# Patient Record
Sex: MALE | Race: WHITE | NOT HISPANIC OR LATINO | Employment: OTHER | URBAN - METROPOLITAN AREA
[De-identification: names, ages, dates, MRNs, and addresses within clinical notes are randomized per-mention and may not be internally consistent; named-entity substitution may affect disease eponyms.]

---

## 2023-10-02 PROBLEM — G45.4 TRANSIENT GLOBAL AMNESIA: Status: ACTIVE | Noted: 2023-10-02

## 2023-10-02 PROBLEM — R73.9 HYPERGLYCEMIA: Status: ACTIVE | Noted: 2023-10-02

## 2023-10-02 PROBLEM — R41.82 ALTERED MENTAL STATUS: Status: ACTIVE | Noted: 2023-10-02

## 2023-10-02 PROBLEM — M75.112 NONTRAUMATIC INCOMPLETE TEAR OF LEFT ROTATOR CUFF: Status: ACTIVE | Noted: 2023-10-02

## 2023-10-02 PROBLEM — R41.89 IMPAIRED COGNITION: Status: ACTIVE | Noted: 2023-10-02

## 2023-10-02 PROBLEM — R41.3 AMNESIA: Status: ACTIVE | Noted: 2023-10-02

## 2023-10-02 PROBLEM — N40.0 ENLARGED PROSTATE: Status: ACTIVE | Noted: 2023-10-02

## 2023-10-02 PROBLEM — H91.90 ACQUIRED HEARING LOSS: Status: ACTIVE | Noted: 2023-10-02

## 2023-10-02 PROBLEM — I10 HYPERTENSION: Status: ACTIVE | Noted: 2023-10-02

## 2023-10-02 PROBLEM — I10 BENIGN ESSENTIAL HYPERTENSION: Status: ACTIVE | Noted: 2023-10-02

## 2023-10-02 PROBLEM — M19.011 PRIMARY OSTEOARTHRITIS, RIGHT SHOULDER: Status: ACTIVE | Noted: 2023-10-02

## 2023-10-02 PROBLEM — G45.9 TRANSIENT ISCHEMIC ATTACK: Status: ACTIVE | Noted: 2023-10-02

## 2023-10-02 PROBLEM — M10.9 GOUT: Status: ACTIVE | Noted: 2023-10-02

## 2023-10-02 RX ORDER — ALLOPURINOL 100 MG/1
100 TABLET ORAL DAILY
COMMUNITY
Start: 2022-05-21 | End: 2024-05-14 | Stop reason: SDUPTHER

## 2023-10-02 RX ORDER — POTASSIUM CHLORIDE 750 MG/1
10 TABLET, FILM COATED, EXTENDED RELEASE ORAL EVERY MORNING
COMMUNITY
End: 2024-05-14 | Stop reason: ALTCHOICE

## 2023-10-02 RX ORDER — ATORVASTATIN CALCIUM 20 MG/1
20 TABLET, FILM COATED ORAL NIGHTLY
COMMUNITY
Start: 2022-03-29 | End: 2024-05-14 | Stop reason: SDUPTHER

## 2023-10-02 RX ORDER — ACETAMINOPHEN 325 MG/1
650 TABLET ORAL EVERY 4 HOURS PRN
COMMUNITY

## 2023-10-02 RX ORDER — ASPIRIN 81 MG/1
TABLET ORAL
COMMUNITY

## 2023-10-02 RX ORDER — LISINOPRIL 10 MG/1
10 TABLET ORAL DAILY
COMMUNITY
End: 2024-05-14 | Stop reason: ALTCHOICE

## 2023-10-02 RX ORDER — AMLODIPINE BESYLATE 5 MG/1
5 TABLET ORAL DAILY
COMMUNITY
Start: 2022-04-12 | End: 2024-05-14 | Stop reason: ALTCHOICE

## 2023-10-02 RX ORDER — BENZONATATE 100 MG/1
CAPSULE ORAL
COMMUNITY
Start: 2020-08-11 | End: 2024-05-14 | Stop reason: ALTCHOICE

## 2023-10-02 RX ORDER — LISINOPRIL 20 MG/1
20 TABLET ORAL DAILY
COMMUNITY
Start: 2021-12-31 | End: 2024-05-14 | Stop reason: SDUPTHER

## 2023-10-02 RX ORDER — AMLODIPINE AND BENAZEPRIL HYDROCHLORIDE 5; 10 MG/1; MG/1
1 CAPSULE ORAL DAILY
COMMUNITY
End: 2024-05-14 | Stop reason: SDUPTHER

## 2023-10-02 RX ORDER — TAMSULOSIN HYDROCHLORIDE 0.4 MG/1
0.4 CAPSULE ORAL DAILY
COMMUNITY
Start: 2022-03-29 | End: 2024-05-14 | Stop reason: SDUPTHER

## 2023-10-02 RX ORDER — HYDROCHLOROTHIAZIDE 25 MG/1
25 TABLET ORAL DAILY
COMMUNITY
End: 2024-05-14 | Stop reason: ALTCHOICE

## 2023-10-04 ENCOUNTER — OFFICE VISIT (OUTPATIENT)
Dept: OTOLARYNGOLOGY | Facility: CLINIC | Age: 73
End: 2023-10-04
Payer: MEDICARE

## 2023-10-04 DIAGNOSIS — H61.23 BILATERAL IMPACTED CERUMEN: Primary | ICD-10-CM

## 2023-10-04 PROCEDURE — 99213 OFFICE O/P EST LOW 20 MIN: CPT | Performed by: OTOLARYNGOLOGY

## 2023-10-04 ASSESSMENT — ENCOUNTER SYMPTOMS: OCCASIONAL FEELINGS OF UNSTEADINESS: 0

## 2023-10-04 NOTE — PROGRESS NOTES
Subjective   Patient ID: Anthony Cleaning is a 72 y.o. male who presents for ear wax.    HPI  Patient has hearing aids, use shearing aids.  On examination there was some wax bilaterally.  Cleaning was done. TMs intact.    Recommendation:  Follow-up once a year    Review of Systems  Difficulty with hearing  Discharge from ears  Sinus pressure  Snoring  Cardiac murmur  Musculoskeletal joint pain  Hypertension  Arthritis    Objective   Physical Exam  General appearance: Healthy-appearing, well-nourished, well groomed, in no acute distress.     Head and Face: Atraumatic with no masses, lesions, or scarring.      Salivary glands: No tenderness of the parotid glands or parotid masses.     No tenderness of the submandibular glands or submandibular masses.      Facial strength: Normal strength and symmetry, no synkinesis or facial tic.     Eyes: Conjunctivas look non-hyperemic bilaterally    Ears: Bilaterally ear wax was cleaned. Ear canals look normal. Tympanic membranes look intact, no hyperemia, fluid or retraction. Hearing grossly normal.      Nose: Mucosa looks normal. No purulent discharge.   Oral Cavity/Mouth: Lips and tongue look normal.     Throat: No postnasal discharge. No tonsil hypertrophy. No hyperemia.    Neck: Symmetrical, trachea midline.     Pulmonary: Normal respiratory effort.     Lymphatic: No palpable pathologic lymph nodes at neck.     Neurological/Psychiatric Orientation to person, place, and time: Normal.     Mood and affect: Normal.      Extremities: No clubbing.     Skin: No significant skin lesions were noted at face or neck    Assessment/Plan   Ear wax was cleaned.    Recommendation:  Follow-up once a year

## 2023-11-03 DIAGNOSIS — U07.1 COVID: Primary | ICD-10-CM

## 2023-11-03 RX ORDER — NIRMATRELVIR AND RITONAVIR 300-100 MG
3 KIT ORAL 2 TIMES DAILY
Qty: 30 TABLET | Refills: 0 | Status: SHIPPED | OUTPATIENT
Start: 2023-11-03 | End: 2023-11-08

## 2023-12-13 ENCOUNTER — OFFICE VISIT (OUTPATIENT)
Dept: ORTHOPEDIC SURGERY | Facility: CLINIC | Age: 73
End: 2023-12-13
Payer: MEDICARE

## 2023-12-13 DIAGNOSIS — M19.011 PRIMARY OSTEOARTHRITIS, RIGHT SHOULDER: Primary | ICD-10-CM

## 2023-12-13 DIAGNOSIS — M25.511 RIGHT SHOULDER PAIN, UNSPECIFIED CHRONICITY: ICD-10-CM

## 2023-12-13 PROCEDURE — 1036F TOBACCO NON-USER: CPT | Performed by: ORTHOPAEDIC SURGERY

## 2023-12-13 PROCEDURE — 1125F AMNT PAIN NOTED PAIN PRSNT: CPT | Performed by: ORTHOPAEDIC SURGERY

## 2023-12-13 PROCEDURE — 1160F RVW MEDS BY RX/DR IN RCRD: CPT | Performed by: ORTHOPAEDIC SURGERY

## 2023-12-13 PROCEDURE — 99213 OFFICE O/P EST LOW 20 MIN: CPT | Performed by: ORTHOPAEDIC SURGERY

## 2023-12-13 PROCEDURE — 20610 DRAIN/INJ JOINT/BURSA W/O US: CPT | Performed by: ORTHOPAEDIC SURGERY

## 2023-12-13 PROCEDURE — 1159F MED LIST DOCD IN RCRD: CPT | Performed by: ORTHOPAEDIC SURGERY

## 2023-12-13 RX ORDER — TRIAMCINOLONE ACETONIDE 40 MG/ML
40 INJECTION, SUSPENSION INTRA-ARTICULAR; INTRAMUSCULAR
Status: COMPLETED | OUTPATIENT
Start: 2023-12-13 | End: 2023-12-13

## 2023-12-13 RX ORDER — BUPIVACAINE HYDROCHLORIDE 2.5 MG/ML
1 INJECTION, SOLUTION INFILTRATION; PERINEURAL
Status: COMPLETED | OUTPATIENT
Start: 2023-12-13 | End: 2023-12-13

## 2023-12-13 RX ADMIN — TRIAMCINOLONE ACETONIDE 40 MG: 40 INJECTION, SUSPENSION INTRA-ARTICULAR; INTRAMUSCULAR at 10:16

## 2023-12-13 RX ADMIN — BUPIVACAINE HYDROCHLORIDE 1 ML: 2.5 INJECTION, SOLUTION INFILTRATION; PERINEURAL at 10:16

## 2023-12-13 ASSESSMENT — PAIN SCALES - GENERAL: PAINLEVEL_OUTOF10: 5 - MODERATE PAIN

## 2023-12-13 ASSESSMENT — PATIENT HEALTH QUESTIONNAIRE - PHQ9
1. LITTLE INTEREST OR PLEASURE IN DOING THINGS: NOT AT ALL
2. FEELING DOWN, DEPRESSED OR HOPELESS: NOT AT ALL
SUM OF ALL RESPONSES TO PHQ9 QUESTIONS 1 & 2: 0

## 2023-12-13 ASSESSMENT — PAIN - FUNCTIONAL ASSESSMENT: PAIN_FUNCTIONAL_ASSESSMENT: 0-10

## 2023-12-13 ASSESSMENT — PAIN DESCRIPTION - DESCRIPTORS: DESCRIPTORS: THROBBING

## 2023-12-13 NOTE — PROGRESS NOTES
Subjective    Patient ID: Chetna Beltre is a 73 y.o. male.    Chief Complaint: Pain of the Right Shoulder         Right Shoulder       This 73-year-old male is here once again complaining of right shoulder pain.  He has a history of degenerative arthritis of the shoulder and has had previous injections with good results.  He states that he was doing quite a bit of work with his arms about a week ago and that he has had increased pain in his shoulder since that time.  He does take occasional anti-inflammatories but limits them due to his other medical issues.  He is here requesting a repeat injection.  Objective   Ortho Exam  On physical examination he is noted to be a somewhat overweight male.  Examination of his right shoulder reveals there he does have stiffness with full elevation as well as the extremes of internal and external rotation.  There is no motor or sensory deficit noted to be a good range of motion.  To the right upper extremity.  Image Results:  MR shoulder right wo IV contrast  PROCEDURE:         SHOULDER RT WO - TMR  2168  REASON FOR EXAM: ACUTE PAIN OF RIGHT SHOULDER    RESULT: MRN: [id number]  Patient Name: CHETNA BELTRE    STUDY:  SHOULDER RT WO 2/3/2023 9:34 am    INDICATION:  ACUTE PAIN OF RIGHT SHOULDER fall pain motion    COMPARISON:  12/20/2022    ACCESSION NUMBER(S):  [id number]    ORDERING CLINICIAN:  ANAIS LUONG    TECHNIQUE:  Multiplanar multisequence MR images were obtained of the right shoulder.    FINDINGS:  Supraspinatus tendon demonstrates mild tendinosis. There is low-grade  partial articular sided tear involving portions of the mid to posterior  supraspinatus tendon at the footprint. Mild subcortical cystic change  demonstrated in the superior facet of the greater tuberosity measuring 3 mm.  No focal full-thickness perforation demonstrated. Supraspinatus muscle is  unremarkable.    Infraspinatus tendon is unremarkable. Infraspinatus muscle is unremarkable.  There is diffuse chronic  atrophy of the teres minor muscle nonspecific. No  focal abnormality in the quadrilateral space.    Subscapularis tendon demonstrates moderate tendinosis. No focal tendon tear  demonstrated. There is mild subcortical cystic change within the lesser  tuberosity. No reactive marrow edema demonstrated. Subscapularis muscle is  unremarkable.    Long head of the biceps tendon demonstrates mild tendinosis in the bicipital  groove with mild asymmetric fluid within the biceps tendon sheath with mild  surrounding edema about the transverse humeral ligament with component of  bicipital tenosynovitis suggested. Intra-articular portion the tendon  proximally demonstrates mild tendinosis. No significant edema within the  rotator interval.    Glenohumeral articulation demonstrates mild osteoarthritic degenerative  change. Glenoid labrum demonstrates no detachment. Axillary pouch is  unremarkable pre quadrilateral space is unremarkable.    Subacromial subdeltoid bursitis. Acromioclavicular joint demonstrates mild  osteoarthritic degenerative change. A type 1 acromion is seen.    IMPRESSION:  1. Bicipital tenosynovitis is demonstrated with mild reactive edema about  the tendon along the anterior margin of the humerus superficial to the  transverse humeral ligament.    2. Mild supraspinatus tendinosis with low-grade partial articular sided tear  of the mid to posterior tendon at the footprint. No focal full-thickness  perforation demonstrated.    3. Moderate subscapularis tendinosis.  Dictation workstation:   [id number]    Original Interpreting Physician:   NII HOROWITZ MD  Original Transcribed by/Date: MMODAL Feb  3 2023  8:50A  Original Electronically Signed by/Date: NII HOROWITZ MD Feb  3 2023 10:32A    Addendum Interpreting Physician:  Addendum Transcribed by/Date: NO ADDENDUM  Addendum Electronically Signed by/Date:      Assessment/Plan   Encounter Diagnoses:  Primary osteoarthritis, right shoulder    Right shoulder  pain, unspecified chronicity  I discussed his clinical findings as well as previous MRI findings with him.  He is here requesting a repeat injection and I agreed.  Patient ID: Anthony Cleaning is a 73 y.o. male.    L Inj/Asp: R glenohumeral on 12/13/2023 10:16 AM  Indications: pain  Details: 25 G needle, lateral approach  Medications: 40 mg triamcinolone acetonide 40 mg/mL; 1 mL BUPivacaine HCl 0.25 % (2.5 mg/mL)  Outcome: tolerated well, no immediate complications  Procedure, treatment alternatives, risks and benefits explained, specific risks discussed. Consent was given by the patient. Immediately prior to procedure a time out was called to verify the correct patient, procedure, equipment, support staff and site/side marked as required. Patient was prepped and draped in the usual sterile fashion.       He is to return on an as-needed basis.

## 2024-05-14 ENCOUNTER — OFFICE VISIT (OUTPATIENT)
Dept: PRIMARY CARE | Facility: CLINIC | Age: 74
End: 2024-05-14
Payer: MEDICARE

## 2024-05-14 VITALS
WEIGHT: 250 LBS | HEART RATE: 73 BPM | SYSTOLIC BLOOD PRESSURE: 122 MMHG | TEMPERATURE: 97.9 F | OXYGEN SATURATION: 96 % | BODY MASS INDEX: 41.65 KG/M2 | HEIGHT: 65 IN | DIASTOLIC BLOOD PRESSURE: 60 MMHG

## 2024-05-14 DIAGNOSIS — R35.0 FREQUENT URINATION: ICD-10-CM

## 2024-05-14 DIAGNOSIS — R53.83 TIRED: ICD-10-CM

## 2024-05-14 DIAGNOSIS — E79.0 HYPERURICEMIA: ICD-10-CM

## 2024-05-14 DIAGNOSIS — M19.90 ARTHRITIS: ICD-10-CM

## 2024-05-14 DIAGNOSIS — Z12.5 SCREENING FOR PROSTATE CANCER: ICD-10-CM

## 2024-05-14 DIAGNOSIS — I10 PRIMARY HYPERTENSION: Primary | ICD-10-CM

## 2024-05-14 PROCEDURE — 3074F SYST BP LT 130 MM HG: CPT | Performed by: FAMILY MEDICINE

## 2024-05-14 PROCEDURE — 1159F MED LIST DOCD IN RCRD: CPT | Performed by: FAMILY MEDICINE

## 2024-05-14 PROCEDURE — 1125F AMNT PAIN NOTED PAIN PRSNT: CPT | Performed by: FAMILY MEDICINE

## 2024-05-14 PROCEDURE — 99214 OFFICE O/P EST MOD 30 MIN: CPT | Performed by: FAMILY MEDICINE

## 2024-05-14 PROCEDURE — 3078F DIAST BP <80 MM HG: CPT | Performed by: FAMILY MEDICINE

## 2024-05-14 RX ORDER — TAMSULOSIN HYDROCHLORIDE 0.4 MG/1
0.4 CAPSULE ORAL DAILY
Qty: 90 CAPSULE | Refills: 3 | Status: SHIPPED | OUTPATIENT
Start: 2024-05-14

## 2024-05-14 RX ORDER — ATORVASTATIN CALCIUM 20 MG/1
20 TABLET, FILM COATED ORAL NIGHTLY
Qty: 90 TABLET | Refills: 3 | Status: SHIPPED | OUTPATIENT
Start: 2024-05-14

## 2024-05-14 RX ORDER — ALLOPURINOL 100 MG/1
100 TABLET ORAL DAILY
Qty: 90 TABLET | Refills: 3 | Status: SHIPPED | OUTPATIENT
Start: 2024-05-14

## 2024-05-14 RX ORDER — AMLODIPINE AND BENAZEPRIL HYDROCHLORIDE 5; 10 MG/1; MG/1
1 CAPSULE ORAL DAILY
Qty: 90 CAPSULE | Refills: 3 | Status: SHIPPED | OUTPATIENT
Start: 2024-05-14

## 2024-05-14 RX ORDER — LISINOPRIL 20 MG/1
20 TABLET ORAL DAILY
Qty: 90 TABLET | Refills: 3 | Status: SHIPPED | OUTPATIENT
Start: 2024-05-14

## 2024-05-14 ASSESSMENT — PATIENT HEALTH QUESTIONNAIRE - PHQ9
2. FEELING DOWN, DEPRESSED OR HOPELESS: NOT AT ALL
SUM OF ALL RESPONSES TO PHQ9 QUESTIONS 1 AND 2: 0
1. LITTLE INTEREST OR PLEASURE IN DOING THINGS: NOT AT ALL

## 2024-05-14 ASSESSMENT — ENCOUNTER SYMPTOMS
DEPRESSION: 0
OCCASIONAL FEELINGS OF UNSTEADINESS: 0
LOSS OF SENSATION IN FEET: 0

## 2024-05-14 ASSESSMENT — PAIN SCALES - GENERAL: PAINLEVEL: 5

## 2024-05-14 NOTE — PROGRESS NOTES
"Subjective   Patient ID: Anthony Cleaning is a 73 y.o. male who presents for Joint Pain.    HPI     Review of Systems    Objective   /60   Pulse 73   Temp 36.6 °C (97.9 °F)   Ht 1.651 m (5' 5\")   Wt 113 kg (250 lb)   SpO2 96%   BMI 41.60 kg/m²     Physical Exam    Assessment/Plan   {Assess/PlanSmartLinks:87926}       "

## 2024-05-17 ENCOUNTER — LAB (OUTPATIENT)
Dept: LAB | Facility: LAB | Age: 74
End: 2024-05-17
Payer: MEDICARE

## 2024-05-17 DIAGNOSIS — E79.0 HYPERURICEMIA: ICD-10-CM

## 2024-05-17 DIAGNOSIS — R53.83 TIRED: ICD-10-CM

## 2024-05-17 DIAGNOSIS — Z12.5 SCREENING FOR PROSTATE CANCER: ICD-10-CM

## 2024-05-17 DIAGNOSIS — M19.90 ARTHRITIS: ICD-10-CM

## 2024-05-17 DIAGNOSIS — I10 PRIMARY HYPERTENSION: ICD-10-CM

## 2024-05-17 LAB
ALBUMIN SERPL BCP-MCNC: 4.7 G/DL (ref 3.4–5)
ALP SERPL-CCNC: 48 U/L (ref 33–136)
ALT SERPL W P-5'-P-CCNC: 39 U/L (ref 10–52)
ANION GAP SERPL CALC-SCNC: 11 MMOL/L (ref 10–20)
AST SERPL W P-5'-P-CCNC: 33 U/L (ref 9–39)
BASOPHILS # BLD AUTO: 0.06 X10*3/UL (ref 0–0.1)
BASOPHILS NFR BLD AUTO: 0.4 %
BILIRUB SERPL-MCNC: 0.5 MG/DL (ref 0–1.2)
BUN SERPL-MCNC: 17 MG/DL (ref 6–23)
CALCIUM SERPL-MCNC: 9.8 MG/DL (ref 8.6–10.3)
CHLORIDE SERPL-SCNC: 102 MMOL/L (ref 98–107)
CHOLEST SERPL-MCNC: 109 MG/DL (ref 0–199)
CHOLESTEROL/HDL RATIO: 3.3
CO2 SERPL-SCNC: 28 MMOL/L (ref 21–32)
CREAT SERPL-MCNC: 0.99 MG/DL (ref 0.5–1.3)
CREAT UR-MCNC: 102 MG/DL (ref 20–370)
EGFRCR SERPLBLD CKD-EPI 2021: 80 ML/MIN/1.73M*2
EOSINOPHIL # BLD AUTO: 0.31 X10*3/UL (ref 0–0.4)
EOSINOPHIL NFR BLD AUTO: 1.8 %
ERYTHROCYTE [DISTWIDTH] IN BLOOD BY AUTOMATED COUNT: 13.6 % (ref 11.5–14.5)
GLUCOSE SERPL-MCNC: 194 MG/DL (ref 74–99)
HCT VFR BLD AUTO: 47.1 % (ref 41–52)
HDLC SERPL-MCNC: 32.8 MG/DL
HGB BLD-MCNC: 15.5 G/DL (ref 13.5–17.5)
IMM GRANULOCYTES # BLD AUTO: 0.05 X10*3/UL (ref 0–0.5)
IMM GRANULOCYTES NFR BLD AUTO: 0.3 % (ref 0–0.9)
LDLC SERPL CALC-MCNC: 51 MG/DL
LYMPHOCYTES # BLD AUTO: 9.09 X10*3/UL (ref 0.8–3)
LYMPHOCYTES NFR BLD AUTO: 54.1 %
MCH RBC QN AUTO: 32 PG (ref 26–34)
MCHC RBC AUTO-ENTMCNC: 32.9 G/DL (ref 32–36)
MCV RBC AUTO: 97 FL (ref 80–100)
MICROALBUMIN UR-MCNC: 30.3 MG/L
MICROALBUMIN/CREAT UR: 29.7 UG/MG CREAT
MONOCYTES # BLD AUTO: 1 X10*3/UL (ref 0.05–0.8)
MONOCYTES NFR BLD AUTO: 6 %
NEUTROPHILS # BLD AUTO: 6.28 X10*3/UL (ref 1.6–5.5)
NEUTROPHILS NFR BLD AUTO: 37.4 %
NON HDL CHOLESTEROL: 76 MG/DL (ref 0–149)
NRBC BLD-RTO: 0 /100 WBCS (ref 0–0)
PLATELET # BLD AUTO: 300 X10*3/UL (ref 150–450)
POTASSIUM SERPL-SCNC: 4.3 MMOL/L (ref 3.5–5.3)
PROT SERPL-MCNC: 7.2 G/DL (ref 6.4–8.2)
PSA SERPL-MCNC: 5.58 NG/ML
RBC # BLD AUTO: 4.84 X10*6/UL (ref 4.5–5.9)
SODIUM SERPL-SCNC: 137 MMOL/L (ref 136–145)
TRIGL SERPL-MCNC: 124 MG/DL (ref 0–149)
TSH SERPL-ACNC: 1.77 MIU/L (ref 0.44–3.98)
URATE SERPL-MCNC: 5.7 MG/DL (ref 4–7.5)
VIT B12 SERPL-MCNC: 502 PG/ML (ref 211–911)
VLDL: 25 MG/DL (ref 0–40)
WBC # BLD AUTO: 16.8 X10*3/UL (ref 4.4–11.3)

## 2024-05-17 PROCEDURE — 82570 ASSAY OF URINE CREATININE: CPT

## 2024-05-17 PROCEDURE — 36415 COLL VENOUS BLD VENIPUNCTURE: CPT

## 2024-05-17 PROCEDURE — G0103 PSA SCREENING: HCPCS

## 2024-05-17 PROCEDURE — 82607 VITAMIN B-12: CPT

## 2024-05-17 PROCEDURE — 82043 UR ALBUMIN QUANTITATIVE: CPT

## 2024-06-17 DIAGNOSIS — D72.829 LEUKOCYTOSIS, UNSPECIFIED TYPE: ICD-10-CM

## 2024-06-17 DIAGNOSIS — R73.9 HYPERGLYCEMIA: Primary | ICD-10-CM

## 2024-06-17 DIAGNOSIS — R97.20 ELEVATED PSA: ICD-10-CM

## 2024-06-18 ENCOUNTER — HOSPITAL ENCOUNTER (OUTPATIENT)
Dept: RADIOLOGY | Facility: HOSPITAL | Age: 74
Discharge: HOME | End: 2024-06-18
Payer: MEDICARE

## 2024-06-18 ENCOUNTER — OFFICE VISIT (OUTPATIENT)
Dept: ORTHOPEDIC SURGERY | Facility: CLINIC | Age: 74
End: 2024-06-18
Payer: MEDICARE

## 2024-06-18 DIAGNOSIS — M25.511 RIGHT SHOULDER PAIN, UNSPECIFIED CHRONICITY: ICD-10-CM

## 2024-06-18 DIAGNOSIS — M19.011 OSTEOARTHRITIS OF RIGHT SHOULDER, UNSPECIFIED OSTEOARTHRITIS TYPE: Primary | ICD-10-CM

## 2024-06-18 PROCEDURE — 73030 X-RAY EXAM OF SHOULDER: CPT | Mod: RIGHT SIDE | Performed by: RADIOLOGY

## 2024-06-18 PROCEDURE — 20611 DRAIN/INJ JOINT/BURSA W/US: CPT | Performed by: ORTHOPAEDIC SURGERY

## 2024-06-18 PROCEDURE — 2500000005 HC RX 250 GENERAL PHARMACY W/O HCPCS: Performed by: ORTHOPAEDIC SURGERY

## 2024-06-18 PROCEDURE — 2500000004 HC RX 250 GENERAL PHARMACY W/ HCPCS (ALT 636 FOR OP/ED): Performed by: ORTHOPAEDIC SURGERY

## 2024-06-18 PROCEDURE — 1125F AMNT PAIN NOTED PAIN PRSNT: CPT | Performed by: ORTHOPAEDIC SURGERY

## 2024-06-18 PROCEDURE — 1036F TOBACCO NON-USER: CPT | Performed by: ORTHOPAEDIC SURGERY

## 2024-06-18 PROCEDURE — 99213 OFFICE O/P EST LOW 20 MIN: CPT | Performed by: ORTHOPAEDIC SURGERY

## 2024-06-18 PROCEDURE — 99213 OFFICE O/P EST LOW 20 MIN: CPT | Mod: 25 | Performed by: ORTHOPAEDIC SURGERY

## 2024-06-18 PROCEDURE — 1159F MED LIST DOCD IN RCRD: CPT | Performed by: ORTHOPAEDIC SURGERY

## 2024-06-18 PROCEDURE — 1160F RVW MEDS BY RX/DR IN RCRD: CPT | Performed by: ORTHOPAEDIC SURGERY

## 2024-06-18 PROCEDURE — 73030 X-RAY EXAM OF SHOULDER: CPT | Mod: RT

## 2024-06-18 ASSESSMENT — PAIN SCALES - GENERAL: PAINLEVEL_OUTOF10: 7

## 2024-06-18 ASSESSMENT — PAIN - FUNCTIONAL ASSESSMENT: PAIN_FUNCTIONAL_ASSESSMENT: 0-10

## 2024-06-19 RX ORDER — LIDOCAINE HYDROCHLORIDE 10 MG/ML
3 INJECTION INFILTRATION; PERINEURAL
Status: COMPLETED | OUTPATIENT
Start: 2024-06-18 | End: 2024-06-18

## 2024-06-19 ASSESSMENT — ENCOUNTER SYMPTOMS
JOINT SWELLING: 0
TROUBLE SWALLOWING: 0
WHEEZING: 0
FATIGUE: 0
FEVER: 0
SHORTNESS OF BREATH: 0
ARTHRALGIAS: 1
CHILLS: 0
COLOR CHANGE: 0
SINUS PRESSURE: 0

## 2024-06-19 NOTE — PROGRESS NOTES
Reason for Appointment  Chief Complaint   Patient presents with    Right Shoulder - Pain     History of Present Illness  New patient is a 73 y.o. male here today for evaluation of his right shoulder.  He has had increased pain for the last few years. Dr. Rodriguez has injected the shoulder in the past.  X-rays taken today are reviewed and show moderate before meals and mild to moderate glenohumeral joint arthritis.  He does have a history of gout.  He has anterior shoulder pain, worse with lifting and overhead activity.  No recent injuries or falls.  No numbness or tingling down the arm.    Past Medical History:   Diagnosis Date    Personal history of other diseases of the circulatory system     History of hypertension    Personal history of other diseases of the nervous system and sense organs     History of Meniere's disease       Past Surgical History:   Procedure Laterality Date    MR HEAD ANGIO WO IV CONTRAST  4/30/2018    MR HEAD ANGIO WO IV CONTRAST [facility]    OTHER SURGICAL HISTORY  09/06/2022    Ankle surgery       Medication Documentation Review Audit       Reviewed by Viridiana Martin PA-C (Physician Assistant) on 06/19/24 at 0933      Medication Order Taking? Sig Documenting Provider Last Dose Status   acetaminophen (Tylenol) 325 mg tablet [id number] Yes 2 tablets (650 mg) every 4 hours if needed for mild pain (1 - 3) or fever (temp greater than 38.0 C). Historical Provider, MD Taking Active   allopurinol (Zyloprim) 100 mg tablet [id number] Yes Take 1 tablet (100 mg) by mouth once daily. For 90 days Mata Oleary, DO Taking Active   amLODIPine-benazepriL (Lotrel) 5-10 mg capsule [id number] Yes Take 1 capsule by mouth once daily. Mata Oleary, DO Taking Active   aspirin (Aspir-Low) 81 mg EC tablet [id number] Yes Take by mouth. Historical Provider, MD Taking Active   atorvastatin (Lipitor) 20 mg tablet [id number] Yes Take 1 tablet (20 mg) by mouth once daily at bedtime. For 90 days Mata Oleary, DO  Taking Active   lisinopril 20 mg tablet [id number] Yes Take 1 tablet (20 mg) by mouth once daily. For 90 days Mata Oleary, DO Taking Active   tamsulosin (Flomax) 0.4 mg 24 hr capsule [id number] Yes Take 1 capsule (0.4 mg) by mouth once daily. Mata Oleary, DO Taking Active                    Allergies   Allergen Reactions    Penicillins Hives and Rash       Review of Systems   Constitutional:  Negative for chills, fatigue and fever.   HENT:  Negative for mouth sores, sinus pressure and trouble swallowing.    Respiratory:  Negative for shortness of breath and wheezing.    Cardiovascular:  Negative for chest pain and leg swelling.   Musculoskeletal:  Positive for arthralgias. Negative for joint swelling.   Skin:  Negative for color change and pallor.     Exam   On exam patient is alert, awake, no acute distress.  Head is normocephalic, no JVD, no auditory wheezes.  He has mild pain with active forward flexion, tender over the joint line but no severe AC joint tenderness today.  Fairly good cuff strength with resisted external rotation and minimally positive impingement signs today.  No severe biceps tenderness.  Deltoid is functional.  Decent motion of the elbow, wrist, and digits.  Mild DJD in the hands but no atrophy.  Good pulses and sensation in the upper extremity.  Skin is warm and dry without ulcerations, no other swelling or lymphadenopathy.    Assessment   Encounter Diagnosis   Name Primary?    Right shoulder pain, unspecified chronicity    Right shoulder osteoarthritis    Plan   He has had injections in the past that have helped him, we discussed a repeat injection of the right shoulder joint to see how much relief he gets.  Likely a shoulder replacement in the future would help him long-term but he would like to avoid this.  We sterilely injected under ultrasound guidance Kenalog and lidocaine into the right shoulder joint.  Patient understands the small risk of infection and the signs look for as well as  flare reaction.  Hopefully this gives him good relief.  He can follow-up with us as needed.    L Inj/Asp: R glenohumeral on 6/18/2024 1:32 PM  Indications: pain  Details: 22 G needle, ultrasound-guided  Medications: 3 mL lidocaine 10 mg/mL (1 %); 40 mg triamcinolone acetonide 10 mg/mL  Outcome: tolerated well, no immediate complications    After discussing the risks and benefits of the procedure we proceeded with the injection. Using ultrasound guidance we anteriorly identified the coracoid process, glenoid and humeral head, also identified the glenohumeral head joint space, images obtained.  We sterilely injected a mixture of 40 mg of Kenalog and 2 cc of 1% lidocaine into the right shoulder joint. Pt tolerated the procedure well without any adverse effects.   Procedure, treatment alternatives, risks and benefits explained, specific risks discussed. Consent was given by the patient. Immediately prior to procedure a time out was called to verify the correct patient, procedure, equipment, support staff and site/side marked as required. Patient was prepped and draped in the usual sterile fashion.       Written by Viridiana Ruelas saw, evaluated, and treated the patient with the PA

## 2024-06-19 NOTE — PROGRESS NOTES
"Subjective   Patient ID: Anthony Cleaning is a 73 y.o. male who presents for Joint Pain.    Joint pain  Hypertension  Arthritis  Hyperuricemia  Frequent urination  Fatigue           Review of Systems   Constitutional:  Negative for fever.        Also see HPI   Eyes:  Negative for visual disturbance.   Respiratory:  Negative for shortness of breath.    Cardiovascular:  Negative for chest pain.   Gastrointestinal:  Negative for diarrhea and nausea.   Endocrine: Negative.    Genitourinary:  Negative for difficulty urinating.   Skin:  Negative for rash.   Neurological:  Negative for dizziness.        No focal deficits   Psychiatric/Behavioral:  Negative for suicidal ideas.    All other systems reviewed and are negative.      Objective   /60   Pulse 73   Temp 36.6 °C (97.9 °F)   Ht 1.651 m (5' 5\")   Wt 113 kg (250 lb)   SpO2 96%   BMI 41.60 kg/m²     Physical Exam  Vitals and nursing note reviewed.   Constitutional:       Appearance: Normal appearance.   HENT:      Head: Normocephalic and atraumatic.   Eyes:      Extraocular Movements: Extraocular movements intact.      Conjunctiva/sclera: Conjunctivae normal.   Cardiovascular:      Rate and Rhythm: Normal rate and regular rhythm.      Heart sounds: Normal heart sounds.   Pulmonary:      Effort: Pulmonary effort is normal.      Breath sounds: Normal breath sounds.      Comments: Lungs essentially CTA b/l  Abdominal:      General: There is no distension.      Palpations: Abdomen is soft. There is no mass.      Tenderness: There is no abdominal tenderness.   Musculoskeletal:      Right lower leg: No edema.      Left lower leg: No edema.   Skin:     Coloration: Skin is not jaundiced.      Findings: No rash.   Neurological:      General: No focal deficit present.      Mental Status: He is alert and oriented to person, place, and time.   Psychiatric:         Mood and Affect: Mood normal.         Behavior: Behavior normal.         Thought Content: Thought content " normal.         Judgment: Judgment normal.         Assessment/Plan   Diagnoses and all orders for this visit:  Primary hypertension  -     amLODIPine-benazepriL (Lotrel) 5-10 mg capsule; Take 1 capsule by mouth once daily.  -     atorvastatin (Lipitor) 20 mg tablet; Take 1 tablet (20 mg) by mouth once daily at bedtime. For 90 days  -     lisinopril 20 mg tablet; Take 1 tablet (20 mg) by mouth once daily. For 90 days  -     Comprehensive Metabolic Panel; Future  -     TSH with reflex to Free T4 if abnormal; Future  -     Uric Acid; Future  -     Lipid Panel; Future  -     Albumin , Urine Random; Future  -     CBC and Auto Differential; Future  -     Vitamin B12; Future  Arthritis  -     Vitamin B12; Future  Screening for prostate cancer  -     Prostate Specific Antigen, Screen; Future  Hyperuricemia  -     allopurinol (Zyloprim) 100 mg tablet; Take 1 tablet (100 mg) by mouth once daily. For 90 days  -     Uric Acid; Future  Frequent urination  -     tamsulosin (Flomax) 0.4 mg 24 hr capsule; Take 1 capsule (0.4 mg) by mouth once daily.  Tired  -     TSH with reflex to Free T4 if abnormal; Future  -     Vitamin B12; Future

## 2024-06-24 ASSESSMENT — ENCOUNTER SYMPTOMS
SHORTNESS OF BREATH: 0
FEVER: 0
DIARRHEA: 0
NAUSEA: 0
DIFFICULTY URINATING: 0
DIZZINESS: 0
ENDOCRINE NEGATIVE: 1

## 2024-10-02 ENCOUNTER — APPOINTMENT (OUTPATIENT)
Dept: OTOLARYNGOLOGY | Facility: CLINIC | Age: 74
End: 2024-10-02
Payer: MEDICARE

## 2024-11-25 ENCOUNTER — APPOINTMENT (OUTPATIENT)
Dept: ORTHOPEDIC SURGERY | Facility: CLINIC | Age: 74
End: 2024-11-25
Payer: MEDICARE

## 2024-12-10 ENCOUNTER — APPOINTMENT (OUTPATIENT)
Dept: ORTHOPEDIC SURGERY | Facility: CLINIC | Age: 74
End: 2024-12-10
Payer: MEDICARE

## 2024-12-10 DIAGNOSIS — M19.011 OSTEOARTHRITIS OF RIGHT SHOULDER, UNSPECIFIED OSTEOARTHRITIS TYPE: Primary | ICD-10-CM

## 2024-12-10 PROCEDURE — 1125F AMNT PAIN NOTED PAIN PRSNT: CPT | Performed by: ORTHOPAEDIC SURGERY

## 2024-12-10 PROCEDURE — 99213 OFFICE O/P EST LOW 20 MIN: CPT | Performed by: ORTHOPAEDIC SURGERY

## 2024-12-10 PROCEDURE — 1160F RVW MEDS BY RX/DR IN RCRD: CPT | Performed by: ORTHOPAEDIC SURGERY

## 2024-12-10 PROCEDURE — 1036F TOBACCO NON-USER: CPT | Performed by: ORTHOPAEDIC SURGERY

## 2024-12-10 PROCEDURE — 2500000004 HC RX 250 GENERAL PHARMACY W/ HCPCS (ALT 636 FOR OP/ED): Performed by: ORTHOPAEDIC SURGERY

## 2024-12-10 PROCEDURE — 20611 DRAIN/INJ JOINT/BURSA W/US: CPT | Mod: RT | Performed by: ORTHOPAEDIC SURGERY

## 2024-12-10 PROCEDURE — 1159F MED LIST DOCD IN RCRD: CPT | Performed by: ORTHOPAEDIC SURGERY

## 2024-12-10 RX ORDER — TRIAMCINOLONE ACETONIDE 40 MG/ML
40 INJECTION, SUSPENSION INTRA-ARTICULAR; INTRAMUSCULAR
Status: COMPLETED | OUTPATIENT
Start: 2024-12-10 | End: 2024-12-10

## 2024-12-10 RX ORDER — LIDOCAINE HYDROCHLORIDE 10 MG/ML
3 INJECTION, SOLUTION INFILTRATION; PERINEURAL
Status: COMPLETED | OUTPATIENT
Start: 2024-12-10 | End: 2024-12-10

## 2024-12-10 ASSESSMENT — ENCOUNTER SYMPTOMS
FATIGUE: 0
FEVER: 0
SORE THROAT: 0
SINUS PAIN: 0
SHORTNESS OF BREATH: 0
WOUND: 0
CHILLS: 0
ARTHRALGIAS: 1
WHEEZING: 0

## 2024-12-10 ASSESSMENT — PAIN - FUNCTIONAL ASSESSMENT: PAIN_FUNCTIONAL_ASSESSMENT: 0-10

## 2024-12-10 ASSESSMENT — PAIN SCALES - GENERAL: PAINLEVEL_OUTOF10: 4

## 2024-12-10 NOTE — PROGRESS NOTES
Reason for Appointment  Chief Complaint   Patient presents with    Left Shoulder - Pain     History of Present Illness  Patient is a 74 y.o. male here today for follow-up evaluation of recurrent right shoulder pain.  He had a previous glenohumeral joint injection that gave him almost 6 months of relief.  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  He has recurrent pain deep in the shoulder joint, difficult to sleep and do any lifting.  Previous x-rays have shown arthritic change in the shoulder.  He is not interested in any further surgical intervention, he would like repeat injection.  No other changes in his past medical history, allergies, or medications.    Past Medical History:   Diagnosis Date    Personal history of other diseases of the circulatory system     History of hypertension    Personal history of other diseases of the nervous system and sense organs     History of Meniere's disease       Past Surgical History:   Procedure Laterality Date    MR HEAD ANGIO WO IV CONTRAST  4/30/2018    MR HEAD ANGIO WO IV CONTRAST [facility]    OTHER SURGICAL HISTORY  09/06/2022    Ankle surgery       Medication Documentation Review Audit       Reviewed by Lida Daugherty MA (Medical Assistant) on 12/10/24 at 0835      Medication Order Taking? Sig Documenting Provider Last Dose Status   acetaminophen (Tylenol) 325 mg tablet [id number] Yes 2 tablets (650 mg) every 4 hours if needed for mild pain (1 - 3) or fever (temp greater than 38.0 C). Historical Provider, MD  Active   allopurinol (Zyloprim) 100 mg tablet [id number] Yes Take 1 tablet (100 mg) by mouth once daily. For 90 days Mata Oleary, DO  Active   amLODIPine-benazepriL (Lotrel) 5-10 mg capsule [id number] Yes Take 1 capsule by mouth once daily. Mata Oleary, DO  Active   aspirin (Aspir-Low) 81 mg EC tablet [id number] Yes Take by mouth. Historical Provider, MD  Active   atorvastatin (Lipitor) 20  mg tablet [id number] Yes Take 1 tablet (20 mg) by mouth once daily at bedtime. For 90 days Mata Oleary, DO  Active   lisinopril 20 mg tablet [id number] Yes Take 1 tablet (20 mg) by mouth once daily. For 90 days Mata Oleary, DO  Active   tamsulosin (Flomax) 0.4 mg 24 hr capsule [id number] Yes Take 1 capsule (0.4 mg) by mouth once daily. Mata Oleary, DO  Active                    Allergies   Allergen Reactions    Penicillins Hives and Rash       Review of Systems   Constitutional:  Negative for chills, fatigue and fever.   HENT:  Negative for nosebleeds, sinus pain and sore throat.    Respiratory:  Negative for shortness of breath and wheezing.    Cardiovascular:  Negative for chest pain and leg swelling.   Musculoskeletal:  Positive for arthralgias.   Skin:  Negative for pallor and wound.     Exam   On exam right shoulder shows pain with about 130 degrees of active forward flexion today.  He has good cuff strength with resisted external rotation.  Tenderness anteriorly over the joint line.  Deltoid is functional.    Assessment   Right shoulder osteoarthritis    Plan   He would like a repeat injection today, previous injection did give him almost 6 months of relief.  We sterilely injected under ultrasound guidance Kenalog and lidocaine into the right shoulder joint.  Patient understands the small risk of infection and the signs to look for as well as flare reaction.  Hopefully this gives him good relief.  He can follow-up with us as needed on a 3 to 6-month basis for injections.  Patient ID: Anthony Cleaning is a 74 y.o. male.    L Inj/Asp: R glenohumeral on 12/10/2024 11:50 AM  Indications: pain  Details: 22 G needle, ultrasound-guided  Medications: 40 mg triamcinolone acetonide 40 mg/mL; 3 mL lidocaine 10 mg/mL (1 %)  Outcome: tolerated well, no immediate complications    After discussing the risks and benefits of the procedure we proceeded with the injection. Using ultrasound guidance we anteriorly identified the  coracoid process, glenoid and humeral head, also identified the glenohumeral head joint space, images obtained and saved.  We sterilely injected a mixture of 40 mg of Kenalog and 1 cc of 1% lidocaine into the right shoulder joint. Pt tolerated the procedure well without any adverse effects.   Procedure, treatment alternatives, risks and benefits explained, specific risks discussed. Consent was given by the patient. Immediately prior to procedure a time out was called to verify the correct patient, procedure, equipment, support staff and site/side marked as required. Patient was prepped and draped in the usual sterile fashion.         I, Viridiana Martin PA-C, am acting as a scribe and attest that this documentation has been prepared under the direction and in the presence of Young Ruelas MD.    By signing below, I, Young Ruelas MD, personally performed the services described in this documentation. All medical record entries made by the scribe were at my direction and in my presence. I have reviewed the chart and agree that the record reflects my personal performance and is accurate and complete.

## 2025-03-03 ENCOUNTER — OFFICE VISIT (OUTPATIENT)
Dept: URGENT CARE | Age: 75
End: 2025-03-03
Payer: MEDICARE

## 2025-03-03 VITALS
RESPIRATION RATE: 18 BRPM | DIASTOLIC BLOOD PRESSURE: 70 MMHG | HEIGHT: 66 IN | OXYGEN SATURATION: 100 % | HEART RATE: 103 BPM | BODY MASS INDEX: 34.55 KG/M2 | SYSTOLIC BLOOD PRESSURE: 160 MMHG | WEIGHT: 215 LBS | TEMPERATURE: 98 F

## 2025-03-03 DIAGNOSIS — B96.89 ACUTE BACTERIAL BRONCHITIS: Primary | ICD-10-CM

## 2025-03-03 DIAGNOSIS — J20.8 ACUTE BACTERIAL BRONCHITIS: Primary | ICD-10-CM

## 2025-03-03 PROCEDURE — 1126F AMNT PAIN NOTED NONE PRSNT: CPT | Performed by: SURGERY

## 2025-03-03 PROCEDURE — 1159F MED LIST DOCD IN RCRD: CPT | Performed by: SURGERY

## 2025-03-03 PROCEDURE — 3008F BODY MASS INDEX DOCD: CPT | Performed by: SURGERY

## 2025-03-03 PROCEDURE — 3078F DIAST BP <80 MM HG: CPT | Performed by: SURGERY

## 2025-03-03 PROCEDURE — 99203 OFFICE O/P NEW LOW 30 MIN: CPT | Performed by: SURGERY

## 2025-03-03 PROCEDURE — 3077F SYST BP >= 140 MM HG: CPT | Performed by: SURGERY

## 2025-03-03 PROCEDURE — 1036F TOBACCO NON-USER: CPT | Performed by: SURGERY

## 2025-03-03 PROCEDURE — 1160F RVW MEDS BY RX/DR IN RCRD: CPT | Performed by: SURGERY

## 2025-03-03 RX ORDER — DOXYCYCLINE 100 MG/1
100 CAPSULE ORAL 2 TIMES DAILY
Qty: 14 CAPSULE | Refills: 0 | Status: SHIPPED | OUTPATIENT
Start: 2025-03-03 | End: 2025-03-10

## 2025-03-03 RX ORDER — PREDNISONE 50 MG/1
50 TABLET ORAL DAILY
Qty: 3 TABLET | Refills: 0 | Status: SHIPPED | OUTPATIENT
Start: 2025-03-03 | End: 2025-03-06

## 2025-03-03 ASSESSMENT — PAIN SCALES - GENERAL: PAINLEVEL_OUTOF10: 0-NO PAIN

## 2025-03-03 NOTE — PATIENT INSTRUCTIONS
[facility]    [address] [place] [address]    [phone]  ---------------------------------------    [facility]   Dr Natasha Tapia  [address]  [address]  [place] [address]  [phone]

## 2025-03-03 NOTE — PROGRESS NOTES
Chief Complaint   Patient presents with    Cough     Patient states this has been going on 1.5 weeks    Nasal Congestion       Physical Exam:     GEN: No acute distress    ENT: Bilateral TMs and canals unremarkable, sinus congestion present. Pharynx and tonsils mildly hyperemic but without exudate.     Resp: Lungs clear to auscultation bilaterally         Assessment:     Encounter Diagnosis   Name Primary?    Acute bacterial bronchitis Yes          Medical Decision Making & Plan:     Doxycycline  Prednisone    Home      03/03/25 at 5:44 PM - Karen Collins, DO

## 2025-05-15 ENCOUNTER — OFFICE VISIT (OUTPATIENT)
Dept: ORTHOPEDIC SURGERY | Facility: HOSPITAL | Age: 75
End: 2025-05-15
Payer: MEDICARE

## 2025-05-15 DIAGNOSIS — M19.011 OSTEOARTHRITIS OF RIGHT SHOULDER, UNSPECIFIED OSTEOARTHRITIS TYPE: Primary | ICD-10-CM

## 2025-05-15 PROCEDURE — 1125F AMNT PAIN NOTED PAIN PRSNT: CPT | Performed by: ORTHOPAEDIC SURGERY

## 2025-05-15 PROCEDURE — 99213 OFFICE O/P EST LOW 20 MIN: CPT | Performed by: ORTHOPAEDIC SURGERY

## 2025-05-15 PROCEDURE — 2500000004 HC RX 250 GENERAL PHARMACY W/ HCPCS (ALT 636 FOR OP/ED): Mod: JZ | Performed by: ORTHOPAEDIC SURGERY

## 2025-05-15 PROCEDURE — 99202 OFFICE O/P NEW SF 15 MIN: CPT | Mod: 25 | Performed by: ORTHOPAEDIC SURGERY

## 2025-05-15 PROCEDURE — 1160F RVW MEDS BY RX/DR IN RCRD: CPT | Performed by: ORTHOPAEDIC SURGERY

## 2025-05-15 PROCEDURE — 1036F TOBACCO NON-USER: CPT | Performed by: ORTHOPAEDIC SURGERY

## 2025-05-15 PROCEDURE — 1159F MED LIST DOCD IN RCRD: CPT | Performed by: ORTHOPAEDIC SURGERY

## 2025-05-15 PROCEDURE — 20611 DRAIN/INJ JOINT/BURSA W/US: CPT | Mod: RT | Performed by: ORTHOPAEDIC SURGERY

## 2025-05-15 RX ORDER — TRIAMCINOLONE ACETONIDE 40 MG/ML
40 INJECTION, SUSPENSION INTRA-ARTICULAR; INTRAMUSCULAR
Status: COMPLETED | OUTPATIENT
Start: 2025-05-15 | End: 2025-05-15

## 2025-05-15 RX ORDER — LIDOCAINE HYDROCHLORIDE 10 MG/ML
3 INJECTION, SOLUTION INFILTRATION; PERINEURAL
Status: COMPLETED | OUTPATIENT
Start: 2025-05-15 | End: 2025-05-15

## 2025-05-15 RX ADMIN — LIDOCAINE HYDROCHLORIDE 3 ML: 10 INJECTION, SOLUTION INFILTRATION; PERINEURAL at 11:00

## 2025-05-15 RX ADMIN — BETAMETHASONE DIPROPIONATE 40 MG: 0.5 OINTMENT TOPICAL at 11:00

## 2025-05-15 ASSESSMENT — ENCOUNTER SYMPTOMS
CHILLS: 0
ARTHRALGIAS: 1
BRUISES/BLEEDS EASILY: 0
SHORTNESS OF BREATH: 0
FATIGUE: 0
NUMBNESS: 0
FEVER: 0
WHEEZING: 0

## 2025-05-15 ASSESSMENT — PAIN - FUNCTIONAL ASSESSMENT: PAIN_FUNCTIONAL_ASSESSMENT: 0-10

## 2025-05-15 ASSESSMENT — PAIN SCALES - GENERAL: PAINLEVEL_OUTOF10: 4

## 2025-05-15 NOTE — PROGRESS NOTES
Reason for Appointment  Chief Complaint   Patient presents with    Left Shoulder - Pain     History of Present Illness  Patient is a 74 y.o. male here today for follow-up evaluation of left shoulder pain. We last saw the patient on 12/10/24 and we gave a right joint injection. Today he states the last injection gave him sig relief. He laid on his left shoulder wrong over the weekend and had a sig flare up. He is getting classic anterior lateral pain. Classic arthritic pain. Tender over the joint line. No numbness so tingling. No recent falls or injuries. No other changes in past medical history, allergies, or medications.      Medical History[1]    Surgical History[2]    Medication Documentation Review Audit       Reviewed by Karen Collins DO (Physician) on 03/03/25 at 1744      Medication Order Taking? Sig Documenting Provider Last Dose Status   acetaminophen (Tylenol) 325 mg tablet [id number] No 2 tablets (650 mg) every 4 hours if needed for mild pain (1 - 3) or fever (temp greater than 38.0 C). Historical Provider, MD Taking Active   allopurinol (Zyloprim) 100 mg tablet [id number] No Take 1 tablet (100 mg) by mouth once daily. For 90 days Mata Oleary, DO Taking Active   amLODIPine-benazepriL (Lotrel) 5-10 mg capsule [id number] No Take 1 capsule by mouth once daily. Mata Oleary, DO Taking Active   aspirin (Aspir-Low) 81 mg EC tablet [id number]  Take by mouth. Historical Provider, MD  Active   atorvastatin (Lipitor) 20 mg tablet [id number] No Take 1 tablet (20 mg) by mouth once daily at bedtime. For 90 days Mata Oleary, DO Taking Active   lisinopril 20 mg tablet [id number] No Take 1 tablet (20 mg) by mouth once daily. For 90 days Mata Oleary, DO Taking Active   tamsulosin (Flomax) 0.4 mg 24 hr capsule [id number] No Take 1 capsule (0.4 mg) by mouth once daily. Mata Oleary, DO Taking Active                    RX Allergies[3]    Review of Systems   Constitutional:  Negative for chills, fatigue and fever.   Respiratory:   Negative for shortness of breath and wheezing.    Cardiovascular:  Negative for chest pain and leg swelling.   Musculoskeletal:  Positive for arthralgias.   Allergic/Immunologic: Negative for immunocompromised state.   Neurological:  Negative for numbness.   Hematological:  Does not bruise/bleed easily.       Exam   Over 150 degrees of elevation of the left arm. Good internal external cuff strength. Tender over the joint line. Good pulses and sensation.     Assessment   Right shoulder osteoarthritis     Plan   We sterilely injected under ultrasound guidance Kenalog and lidocaine into the right shoulder joint. Patient understands the small risk of infection and the signs to look for as well as flare reaction. Hopefully this gives him good relief. He can follow-up with us as needed on a 3 to 6-month basis for injections.   Discussed long-term shoulder replacement  Patient ID: Anthony Cleaning is a 74 y.o. male.    L Inj/Asp: R glenohumeral on 5/15/2025 11:00 AM  Indications: pain  Details: 22 G needle, ultrasound-guided  Medications: 40 mg triamcinolone acetonide 40 mg/mL; 3 mL lidocaine 10 mg/mL (1 %)  Outcome: tolerated well, no immediate complications    After discussing the risks and benefits of the procedure we proceeded with the injection. Using ultrasound guidance we anteriorly identified the coracoid process, glenoid and humeral head, also identified the glenohumeral head joint space, images obtained and saved.  We sterilely injected a mixture of 40 mg of Kenalog and 1 cc of 1% lidocaine into the right shoulder joint. Pt tolerated the procedure well without any adverse effects.    Procedure, treatment alternatives, risks and benefits explained, specific risks discussed. Consent was given by the patient. Immediately prior to procedure a time out was called to verify the correct patient, procedure, equipment, support staff and site/side marked as required. Patient was prepped and draped in the usual sterile  fashion.           I, Deana Gerber, attest that this documentation has been prepared under the direction and in the presence of Young Ruelas MD.   By signing below, I, Young Ruelas MD, personally performed the services described in this documentation. All medical record entries made by the scribe were at my direction and in my presence. I have reviewed the chart and agree that the record reflects my personal performance and is accurate and complete.         [1]   Past Medical History:  Diagnosis Date    Personal history of other diseases of the circulatory system     History of hypertension    Personal history of other diseases of the nervous system and sense organs     History of Meniere's disease   [2]   Past Surgical History:  Procedure Laterality Date    MR HEAD ANGIO WO IV CONTRAST  4/30/2018    MR HEAD ANGIO WO IV CONTRAST [facility]    OTHER SURGICAL HISTORY  09/06/2022    Ankle surgery   [3]   Allergies  Allergen Reactions    Penicillins Hives and Rash

## 2025-06-02 ENCOUNTER — APPOINTMENT (OUTPATIENT)
Dept: ORTHOPEDIC SURGERY | Facility: CLINIC | Age: 75
End: 2025-06-02
Payer: MEDICARE

## 2025-06-13 NOTE — PROGRESS NOTES
Patient is a 74 y.o. male presenting as a new patient with an elevated PSA and PMH of BPH.    SUBJECTIVE:  HPI  He present with concern of an elevated PSA, referred by Lionel Houston,*  His last PSA was 7.73 in April 2025.  He has history of BPH, treated with tamsulosin.  He states he has a history of left testicular tenderness with pressure.    Medical History[1]    Review of Systems   All systems have been reviewed and are negative unless otherwise noted in the HPI.    OBJECTIVE:  There were no vitals taken for this visit.    Physical Exam   Constitutional: No obvious distress.  Cardiovascular: Extremities are warm and well perfused.  Respiratory: No audible wheezing/stridor; respirations do not appear labored.  Neurologic: Alert and oriented x3.  Genitourinary: No CVA tenderness, bladder not palpable.   No scrotal masses or tenderness. No penile lesions.   KHADAR: prostate is enlarged without nodules or tenderness.      LABS:  Lab Results   Component Value Date    WBC 16.8 (H) 05/17/2024    HGB 15.5 05/17/2024    HCT 47.1 05/17/2024    MCV 97 05/17/2024     05/17/2024    GLUCOSE 194 (H) 05/17/2024    CALCIUM 9.8 05/17/2024     05/17/2024    K 4.3 05/17/2024    CO2 28 05/17/2024     05/17/2024    BUN 17 05/17/2024    CREATININE 0.99 05/17/2024    EGFR 80 05/17/2024    URICACID 5.7 05/17/2024    PSA 3.9 12/03/2021     IMAGING:  PROCEDURES:  PVR: 25 mL  6/16/25    ASSESSMENT/PLAN:  Problem List Items Addressed This Visit       Elevated PSA - Primary     Other Visit Diagnoses         Urinary symptom or sign        Relevant Orders    POCT UA Automated manually resulted (Completed)    Measure post void residual (Completed)           He has history of an elevated PSA. He will scheduled prostate MRI and be scheduled for prostate biopsy thereafter.   PSA summary  04/17/2025 7.73  06/17/2024 5.58  12/03/2021 3.9  07/13/2018 3.1    He has history of BPH, treated with tamsulosin. He is emptying  adequately.    He has an elevated white count and will follow up with Dr. Tripathi regarding this.       All questions were answered to the patient’s satisfaction.  Patient agrees with the plan and wishes to proceed.  Follow-up will be scheduled appropriately.     Scribe Attestation  By signing my name below, I, Karlie Manuel,   attest that this documentation has been prepared under the direction and in the presence of Jay Walter MD.           [1]  Past Medical History:  Diagnosis Date   • Personal history of other diseases of the circulatory system     History of hypertension   • Personal history of other diseases of the nervous system and sense organs     History of Meniere's disease

## 2025-06-16 ENCOUNTER — APPOINTMENT (OUTPATIENT)
Dept: UROLOGY | Facility: CLINIC | Age: 75
End: 2025-06-16
Payer: MEDICARE

## 2025-06-16 DIAGNOSIS — N40.1 BENIGN PROSTATIC HYPERPLASIA WITH WEAK URINARY STREAM: ICD-10-CM

## 2025-06-16 DIAGNOSIS — R39.12 BENIGN PROSTATIC HYPERPLASIA WITH WEAK URINARY STREAM: ICD-10-CM

## 2025-06-16 DIAGNOSIS — R97.20 ELEVATED PSA: Primary | ICD-10-CM

## 2025-06-16 DIAGNOSIS — R39.9 URINARY SYMPTOM OR SIGN: ICD-10-CM

## 2025-06-16 LAB
POC APPEARANCE, URINE: CLEAR
POC BILIRUBIN, URINE: NEGATIVE
POC BLOOD, URINE: NEGATIVE
POC COLOR, URINE: YELLOW
POC GLUCOSE, URINE: NEGATIVE MG/DL
POC KETONES, URINE: NEGATIVE MG/DL
POC LEUKOCYTES, URINE: NEGATIVE
POC NITRITE,URINE: NEGATIVE
POC PH, URINE: 5.5 PH
POC PROTEIN, URINE: NEGATIVE MG/DL
POC SPECIFIC GRAVITY, URINE: <=1.005
POC UROBILINOGEN, URINE: 0.2 EU/DL

## 2025-06-16 PROCEDURE — 99204 OFFICE O/P NEW MOD 45 MIN: CPT | Performed by: UROLOGY

## 2025-06-16 PROCEDURE — 1126F AMNT PAIN NOTED NONE PRSNT: CPT | Performed by: UROLOGY

## 2025-06-16 PROCEDURE — 1159F MED LIST DOCD IN RCRD: CPT | Performed by: UROLOGY

## 2025-06-16 PROCEDURE — 1036F TOBACCO NON-USER: CPT | Performed by: UROLOGY

## 2025-06-16 PROCEDURE — G2211 COMPLEX E/M VISIT ADD ON: HCPCS | Performed by: UROLOGY

## 2025-06-16 PROCEDURE — 81003 URINALYSIS AUTO W/O SCOPE: CPT | Performed by: UROLOGY

## 2025-06-16 PROCEDURE — 1160F RVW MEDS BY RX/DR IN RCRD: CPT | Performed by: UROLOGY

## 2025-06-16 ASSESSMENT — PAIN SCALES - GENERAL: PAINLEVEL_OUTOF10: 0-NO PAIN

## 2025-06-30 ENCOUNTER — HOSPITAL ENCOUNTER (OUTPATIENT)
Dept: RADIOLOGY | Facility: HOSPITAL | Age: 75
Discharge: HOME | End: 2025-06-30
Payer: MEDICARE

## 2025-06-30 DIAGNOSIS — R97.20 ELEVATED PSA: ICD-10-CM

## 2025-06-30 PROCEDURE — 76498 UNLISTED MR PROCEDURE: CPT

## 2025-06-30 PROCEDURE — 76498 UNLISTED MR PROCEDURE: CPT | Performed by: RADIOLOGY

## 2025-06-30 PROCEDURE — 72197 MRI PELVIS W/O & W/DYE: CPT

## 2025-06-30 PROCEDURE — A9575 INJ GADOTERATE MEGLUMI 0.1ML: HCPCS | Performed by: UROLOGY

## 2025-06-30 PROCEDURE — 2550000001 HC RX 255 CONTRASTS: Performed by: UROLOGY

## 2025-06-30 PROCEDURE — 72197 MRI PELVIS W/O & W/DYE: CPT | Performed by: RADIOLOGY

## 2025-06-30 RX ORDER — GADOTERATE MEGLUMINE 376.9 MG/ML
20 INJECTION INTRAVENOUS
Status: COMPLETED | OUTPATIENT
Start: 2025-06-30 | End: 2025-06-30

## 2025-06-30 RX ADMIN — GADOTERATE MEGLUMINE 20 ML: 376.9 INJECTION, SOLUTION INTRAVENOUS at 16:38

## 2025-07-06 DIAGNOSIS — R97.20 ELEVATED PSA: Primary | ICD-10-CM

## 2025-07-24 ENCOUNTER — PRE-ADMISSION TESTING (OUTPATIENT)
Dept: PREADMISSION TESTING | Facility: HOSPITAL | Age: 75
End: 2025-07-24
Payer: MEDICARE

## 2025-07-24 VITALS
WEIGHT: 227 LBS | RESPIRATION RATE: 16 BRPM | TEMPERATURE: 97.3 F | HEART RATE: 101 BPM | BODY MASS INDEX: 36.48 KG/M2 | OXYGEN SATURATION: 99 % | DIASTOLIC BLOOD PRESSURE: 78 MMHG | SYSTOLIC BLOOD PRESSURE: 141 MMHG | HEIGHT: 66 IN

## 2025-07-24 DIAGNOSIS — Z01.818 PREOP TESTING: Primary | ICD-10-CM

## 2025-07-24 DIAGNOSIS — E11.69 TYPE 2 DIABETES MELLITUS WITH OTHER SPECIFIED COMPLICATION, WITHOUT LONG-TERM CURRENT USE OF INSULIN: ICD-10-CM

## 2025-07-24 LAB
BASOPHILS # BLD AUTO: 0.05 X10*3/UL (ref 0–0.1)
BASOPHILS NFR BLD AUTO: 0.3 %
EOSINOPHIL # BLD AUTO: 0.2 X10*3/UL (ref 0–0.4)
EOSINOPHIL NFR BLD AUTO: 1.3 %
ERYTHROCYTE [DISTWIDTH] IN BLOOD BY AUTOMATED COUNT: 13.9 % (ref 11.5–14.5)
EST. AVERAGE GLUCOSE BLD GHB EST-MCNC: 154 MG/DL
HBA1C MFR BLD: 7 % (ref ?–5.7)
HCT VFR BLD AUTO: 46.2 % (ref 41–52)
HGB BLD-MCNC: 15.4 G/DL (ref 13.5–17.5)
IMM GRANULOCYTES # BLD AUTO: 0.06 X10*3/UL (ref 0–0.5)
IMM GRANULOCYTES NFR BLD AUTO: 0.4 % (ref 0–0.9)
LYMPHOCYTES # BLD AUTO: 8.04 X10*3/UL (ref 0.8–3)
LYMPHOCYTES NFR BLD AUTO: 51 %
MCH RBC QN AUTO: 31.4 PG (ref 26–34)
MCHC RBC AUTO-ENTMCNC: 33.3 G/DL (ref 32–36)
MCV RBC AUTO: 94 FL (ref 80–100)
MONOCYTES # BLD AUTO: 0.85 X10*3/UL (ref 0.05–0.8)
MONOCYTES NFR BLD AUTO: 5.4 %
NEUTROPHILS # BLD AUTO: 6.56 X10*3/UL (ref 1.6–5.5)
NEUTROPHILS NFR BLD AUTO: 41.6 %
NRBC BLD-RTO: 0 /100 WBCS (ref 0–0)
PLATELET # BLD AUTO: 242 X10*3/UL (ref 150–450)
RBC # BLD AUTO: 4.9 X10*6/UL (ref 4.5–5.9)
WBC # BLD AUTO: 15.8 X10*3/UL (ref 4.4–11.3)

## 2025-07-24 PROCEDURE — 93005 ELECTROCARDIOGRAM TRACING: CPT

## 2025-07-24 PROCEDURE — 83036 HEMOGLOBIN GLYCOSYLATED A1C: CPT | Mod: TRILAB

## 2025-07-24 PROCEDURE — 85025 COMPLETE CBC W/AUTO DIFF WBC: CPT

## 2025-07-24 PROCEDURE — 93010 ELECTROCARDIOGRAM REPORT: CPT | Performed by: INTERNAL MEDICINE

## 2025-07-24 PROCEDURE — 36415 COLL VENOUS BLD VENIPUNCTURE: CPT

## 2025-07-24 RX ORDER — TRAZODONE HYDROCHLORIDE 50 MG/1
25 TABLET ORAL NIGHTLY
COMMUNITY
Start: 2025-06-25 | End: 2025-09-23

## 2025-07-24 ASSESSMENT — ENCOUNTER SYMPTOMS
RESPIRATORY NEGATIVE: 1
GASTROINTESTINAL NEGATIVE: 1
CARDIOVASCULAR NEGATIVE: 1
MUSCULOSKELETAL NEGATIVE: 1
ENDOCRINE NEGATIVE: 1
FREQUENCY: 1
NEUROLOGICAL NEGATIVE: 1
HEMATOLOGIC/LYMPHATIC NEGATIVE: 1
PSYCHIATRIC NEGATIVE: 1
CONSTITUTIONAL NEGATIVE: 1

## 2025-07-24 NOTE — H&P (VIEW-ONLY)
CPM/PAT Evaluation       Name: Anthony Cleaning (Anthony Cleaning)  /Age: 1950/74 y.o.     In-Person       Chief Complaint: Elevated PSA    HPI    Pt is a 74 year old male with an elevated PSA. Pt completed prostate MRI that showed:     A PI-RADS 5 lesion in the left base peripheral zone extending  into the adjacent transition zone and right peripheral zone.  Extracapsular extension with the left neurovascular bundle  involvement and involvement of the medial left seminal vesicle. No  rectal wall involvement.  2. Enlarged external iliac and prominent common iliac  lymphadenopathy, concerning for metastatic lymphadenopathy.    Pt reports history of BPH with occasional symptoms of urinary frequency and urinary urgency. Pt denies pelvic pain, urinary hesitancy, and gross hematuria. Pt was examined by his urologist and has been scheduled for prostate biopsy with imaging guidance. Pt denies CP, SOB, or dizziness.     Past Medical History:   Diagnosis Date    Abnormal blood sugar     Anxiety     BPH (benign prostatic hyperplasia)     with urinary frequency and urgency    Colon polyp See Dr Meyers    Removed and they were precancerous    Depression     Hearing aid worn     HL (hearing loss) Several years    I wear hearing aids    Hyperlipidemia     Hypertension Ongoing problem for several years    Insomnia     Leukocytosis     Murmur Few years ago    Personal history of other diseases of the circulatory system     History of hypertension    Personal history of other diseases of the nervous system and sense organs     History of Meniere's disease    Prostate cancer (Multi) This year    Seen on MRI recently    Transient global amnesia        Past Surgical History:   Procedure Laterality Date    ANKLE FRACTURE SURGERY Left     CATARACT EXTRACTION  Years ago    MR HEAD ANGIO WO IV CONTRAST  2018    MR HEAD ANGIO WO IV CONTRAST [facility]     Social History     Tobacco Use    Smoking status: Never      Passive exposure: Never    Smokeless tobacco: Never   Substance Use Topics    Alcohol use: Never     Social History     Substance and Sexual Activity   Drug Use Never     Patient  reports that he is not currently sexually active and has had partner(s) who are female. He reports using the following method of birth control/protection: None.    Family History[1]    Allergies   Allergen Reactions    Penicillins Hives and Rash     Current Outpatient Medications   Medication Sig Dispense Refill    traZODone (Desyrel) 50 mg tablet Take 0.5 tablets (25 mg) by mouth once daily at bedtime.      acetaminophen (Tylenol) 325 mg tablet 2 tablets (650 mg) every 4 hours if needed for mild pain (1 - 3) or fever (temp greater than 38.0 C).      allopurinol (Zyloprim) 100 mg tablet Take 1 tablet (100 mg) by mouth once daily. For 90 days 90 tablet 3    amLODIPine-benazepriL (Lotrel) 5-10 mg capsule Take 1 capsule by mouth once daily. 90 capsule 3    aspirin (Aspir-Low) 81 mg EC tablet Take 1 tablet (81 mg) by mouth once daily.      atorvastatin (Lipitor) 20 mg tablet Take 1 tablet (20 mg) by mouth once daily at bedtime. For 90 days 90 tablet 3    lisinopril 20 mg tablet Take 1 tablet (20 mg) by mouth once daily. For 90 days 90 tablet 3    tamsulosin (Flomax) 0.4 mg 24 hr capsule Take 1 capsule (0.4 mg) by mouth once daily. 90 capsule 3     No current facility-administered medications for this visit.     Review of Systems   Constitutional: Negative.    HENT: Negative.     Respiratory: Negative.     Cardiovascular: Negative.    Gastrointestinal: Negative.    Endocrine: Negative.    Genitourinary:  Positive for frequency and urgency.   Musculoskeletal: Negative.    Skin: Negative.    Neurological: Negative.    Hematological: Negative.    Psychiatric/Behavioral: Negative.       Physical Exam  Vitals reviewed.   Constitutional:       Appearance: Normal appearance. He is obese.   HENT:      Head: Normocephalic and atraumatic.      Nose: Nose  "normal.      Mouth/Throat:      Mouth: Mucous membranes are moist.      Pharynx: Oropharynx is clear.     Eyes:      Extraocular Movements: Extraocular movements intact.      Conjunctiva/sclera: Conjunctivae normal.      Pupils: Pupils are equal, round, and reactive to light.       Cardiovascular:      Rate and Rhythm: Normal rate and regular rhythm.      Pulses: Normal pulses.      Heart sounds: Normal heart sounds.   Pulmonary:      Effort: Pulmonary effort is normal. No respiratory distress.      Breath sounds: Normal breath sounds. No wheezing, rhonchi or rales.   Abdominal:      Palpations: Abdomen is soft.      Tenderness: There is no abdominal tenderness. There is no guarding or rebound.     Musculoskeletal:         General: Normal range of motion.      Cervical back: Normal range of motion and neck supple.      Left lower leg: Edema (trace edema) present.     Skin:     General: Skin is warm and dry.     Neurological:      General: No focal deficit present.      Mental Status: He is alert and oriented to person, place, and time. Mental status is at baseline.     Psychiatric:         Mood and Affect: Mood normal.         Behavior: Behavior normal.         Thought Content: Thought content normal.         Judgment: Judgment normal.          PAT AIRWAY:   Airway:     Mallampati::  II    TM distance::  >3 FB    Neck ROM::  Full      Visit Vitals  /78   Pulse 101   Temp 36.3 °C (97.3 °F) (Temporal)   Resp 16   Ht 1.676 m (5' 6\")   Wt 103 kg (227 lb)   SpO2 99%   BMI 36.64 kg/m²   Smoking Status Never   BSA 2.19 m²     ASA: 3   CHADS: 2.8%  RCRI: 0.4%  Ariscat: 1.6%  DASI Risk Score      Flowsheet Row Questionnaire Series Submission from 7/8/2025 in [facility] OR with Generic Provider Min   Can you take care of yourself (eat, dress, bathe, or use toilet)?  2.75  filed at 07/08/2025 1238   Can you walk indoors, such as around your house? 1.75  filed at 07/08/2025 1238   Can you walk a block " or two on level ground?  2.75  filed at 07/08/2025 1238   Can you climb a flight of stairs or walk up a hill? 5.5  filed at 07/08/2025 1238   Can you run a short distance? 0  filed at 07/08/2025 1238   Can you do light work around the house like dusting or washing dishes? 2.7  filed at 07/08/2025 1238   Can you do moderate work around the house like vacuuming, sweeping floors or carrying groceries? 3.5  filed at 07/08/2025 1238   Can you do heavy work around the house like scrubbing floors or lifting and moving heavy furniture?  8  filed at 07/08/2025 1238   Can you do yard work like raking leaves, weeding or pushing a mower? 0  filed at 07/08/2025 1238   Can you have sexual relations? 5.25  filed at 07/08/2025 1238   Can you participate in moderate recreational activities like golf, bowling, dancing, doubles tennis or throwing a baseball or football? 0  filed at 07/08/2025 1238   Can you participate in strenous sports like swimming, singles tennis, football, basketball, or skiing? 0  filed at 07/08/2025 1238   DASI SCORE 32.2  filed at 07/08/2025 1238   METS Score (Will be calculated only when all the questions are answered) 6.7  filed at 07/08/2025 1238     Caprini DVT Assessment    No data to display  Modified Frailty Index    No data to display  [id number] Stroke Risk Points  Current as of just now        N/A 0 to 9 Points:      Last Change: N/A          The [id number] risk score (Lip GH, et al. 2009. © 2010 [employer]) quantifies the risk of stroke for a patient with atrial fibrillation. For patients without atrial fibrillation or under the age of 18 this score appears as N/A. Higher score values generally indicate higher risk of stroke.        This score is not applicable to this patient. Components are not calculated.          Revised Cardiac Risk Index      Flowsheet Row Pre-Admission Testing from 7/24/2025 in [facility]   High-Risk Surgery (Intraperitoneal,  Intrathoracic,Suprainguinal vascular) 0 filed at 07/24/2025 1033   History of ischemic heart disease (History of MI, History of positive exercuse test, Current chest paint considered due to myocardial ischemia, Use of nitrate therapy, ECG with pathological Q Waves) 0 filed at 07/24/2025 1033   History of congestive heart failure (pulmonary edemia, bilateral rales or S3 gallop, Paroxysmal nocturnal dyspnea, CXR showing pulmonary vascular redistribution) 0 filed at 07/24/2025 1033   History of cerebrovascular disease (Prior TIA or stroke) 0 filed at 07/24/2025 1033   Pre-operative insulin treatment 0 filed at 07/24/2025 1033   Pre-operative creatinine>2 mg/dl 0 filed at 07/24/2025 1033   Revised Cardiac Risk Calculator 0 filed at 07/24/2025 1033     Apfel Simplified Score    No data to display  Risk Analysis Index Results This Encounter    No data found in the last 10 encounters.       Stop Bang Score      Flowsheet Row Pre-Admission Testing from 7/24/2025 in [facility] Questionnaire Series Submission from 7/8/2025 in [facility] OR with Generic Provider Min   Do you snore loudly? 1 filed at 07/24/2025 0956 1  filed at 07/08/2025 1238   Do you often feel tired or fatigued after your sleep? 0 filed at 07/24/2025 0956 0  filed at 07/08/2025 1238   Has anyone ever observed you stop breathing in your sleep? 1 filed at 07/24/2025 0956 1  filed at 07/08/2025 1238   Do you have or are you being treated for high blood pressure? 1 filed at 07/24/2025 0956 1  filed at 07/08/2025 1238   Recent BMI (Calculated) 36.7 filed at 07/24/2025 0956 34.7  filed at 07/08/2025 1238   Is BMI greater than 35 kg/m2? 1=Yes filed at 07/24/2025 0956 0=No  filed at 07/08/2025 1238   Age older than 50 years old? 1=Yes filed at 07/24/2025 0956 1=Yes  filed at 07/08/2025 1238   Is your neck circumference greater than 17 inches (Male) or 16 inches (Female)? 1 filed at 07/24/2025 0956 --   Gender - Male 1=Yes filed  at 07/24/2025 0956 1=Yes  filed at 07/08/2025 1238   STOP-BANG Total Score 7 filed at 07/24/2025 0956 --     Prodigy: High Risk  Total Score: 20              Prodigy Age Score      Prodigy Gender Score          ARISCAT Score for Postoperative Pulmonary Complications    No data to display  [place] Perioperative Risk for Myocardial Infarction or Cardiac Arrest (NAYE)    No data to display      Assessment and Plan:     Elevated PSA: BIOPSY, PROSTATE, WITH IMAGING GUIDANCE   HTN: Pt is taking lisinopril and amlodipine-benazepril.   Hyperlipidemia: Pt is taking atorvastatin.   BPH: Pt is taking tamsulosin. Pt has history of occasional urinary frequency and occasional urinary urgency.   Leukocytosis: History of elevated WBC. Will check CBC in PAT. Pt stated he has not seen a hematologist but his current Doctor wants him to see one. Elevated WBC can be seen on blood work since 2018.   Trivial Tricuspid regurgitation: found on 2018 ECHO in [facility] cardiology. Pt saw Dr Ny in the past.   Anxiety/Depression: Pt stated he is grieving the loss of his wife who passed away one year ago.   Insomnia: pt is taking trazodone as needed.   Elevated blood sugars: Will check HgbA1C in PAT. Pt's last HgbA1C on 4/17/2025 was 7.3. Pt denies ever being diagnosed with Diabetes.   Gout: Pt is taking allopurinol.   Hearing loss/ Meniere's Disease: Pt wears bilateral hearing aids.   BMI: 36.64    CBC ordered in PAT.  CMP completed on 5/17/2025.  EKG performed in PAT.    Pt scheduled to see a hematologist on 7/31/2025 for pre op clearance. Pt has history of leukocytosis.  Pt found old records that his past PCP wanted him to see a hematologist and his current PCP recently told him she wanted him to see a hematologist.     LISA Mcdaniels    7/25/2025:  Pt is scheduled to see Dr Gil on 7/29/2025 for cardiac preop assessment d/t abnormal EKG. Pt was made aware.     LISA Mcdaniels    ADDENDUM:  CARDIAC CLEARANCE  7/29/2025 WITH DR. YVONNE MIN - PATIENT OK TO PROCEED WITH SURGERY WITH MODERATE CARDIOVASCULAR RISK - CLEARANCE NOTE UNDER ENCOUNTERS TAB    HEMATOLOGY CLEARANCE 7/31/2025 WITH DR. VIRGINIA JIMENEZ - clearance given via SECURE CHAT MESSAGING:           [1]   Family History  Problem Relation Name Age of Onset    Heart disease Mother      Diabetes Mother      Early natural death Father Wally Cleaning 70 - 79

## 2025-07-24 NOTE — PREPROCEDURE INSTRUCTIONS
Why must I stop eating and drinking near surgery time?  With sedation, food or liquid in your stomach can enter your lungs causing serious complications  Increases nausea and vomiting    When do I need to stop eating and drinking before my surgery?   Do not eat or drink after midnight the night before your surgery/procedure.  You may have small sips of water to take your medication.    PAT DISCHARGE INSTRUCTIONS    The Same Day Surgery (SDS) Department of the hospital where your procedure will be performed will contact you after 2:00 PM the day before your surgery. If you are scheduled on a Monday, or a Tuesday following a Monday holiday, they will call on the last business day prior to your surgery.  Please check your voicemail for any missed messages.      [facility]  [address] [place] [address]  [phone]    Please let your surgeon know if:      You develop any open sores, shingles, burning or painful urination as these may increase your risk of an infection.   You no longer wish to have the surgery.   Any other personal circumstances change that may lead to the need to cancel or defer this surgery-such as being sick or getting admitted to any hospital within one week of your planned procedure.    Your contact details change, such as a change of address or phone number.    Starting now:     Please DO NOT drink alcohol or smoke for 24 hours before surgery. It is well known that quitting smoking can make a huge difference to your health and recovery from surgery. The longer you abstain from smoking, the better your chances of a healthy recovery. If you need help with quitting, call 0-800QUIT-NOW to be connected to a trained counselor who will discuss the best methods to help you quit.     Before your surgery:    Please stop all supplements 7 days prior to surgery. Or as directed by your surgeon.   Please stop taking NSAID pain medicine such as Advil and  Motrin 7 days before surgery.    If you develop any fever, cough, cold, rashes, cuts, scratches, scrapes, urinary symptoms or infection anywhere on your body (including teeth and gums) prior to surgery, please call your surgeon’s office as soon as possible. This may require treatment to reduce the chance of cancellation on the day of surgery.    The day before your surgery:   DIET- Please follow the diet instructions at the top of your packet.   Get a good night’s rest.  Use the special soap for bathing if you have been instructed to use one.    Scheduled surgery times may change and you will be notified if this occurs - please check your personal voicemail for any updates.     On the morning of surgery:   Wear comfortable, loose fitting clothes which open in the front. Please do not wear moisturizers, creams, lotions, makeup or perfume.    Please bring with you to surgery:   Photo ID and insurance card   Current list of medicines and allergies   Pacemaker/ Defibrillator/Heart stent cards   CPAP machine and mask    Slings/ splints/ crutches   A copy of your complete advanced directive/DHPOA.    Please do NOT bring with you to surgery:   All jewelry and valuables should be left at home.   Prosthetic devices such as contact lenses, hearing aids, dentures, eyelash extensions, hairpins and body piercings must be removed prior to going in to the surgical suite.    After outpatient surgery:   A responsible adult MUST accompany you at the time of discharge and stay with you for 24 hours after your surgery. You may NOT drive yourself home after surgery.    Do not drive, operate machinery, make critical decisions or do activities that require co-ordination or balance until after a night’s sleep.   Do not drink alcoholic beverages for 24 hours.   Instructions for resuming your medications will be provided by your surgeon.    CALL YOUR DOCTOR AFTER SURGERY IF YOU HAVE:     Chills and/or a fever of 101° F or higher.    Redness,  swelling, pus or drainage from your surgical wound or a bad smell from the wound.    Lightheadedness, fainting or confusion.    Persistent vomiting (throwing up) and are not able to eat or drink for 12 hours.    Three or more loose, watery bowel movements in 24 hours (diarrhea).   Difficulty or pain while urinating( after non-urological surgery)    Pain and swelling in your legs, especially if it is only on one side.    Difficulty breathing or are breathing faster than normal.    Any new concerning symptoms.           Medication List            Accurate as of July 24, 2025 10:04 AM. Always use your most recent med list.                acetaminophen 325 mg tablet  Commonly known as: Tylenol  Medication Adjustments for Surgery: Take/Use as prescribed     allopurinol 100 mg tablet  Commonly known as: Zyloprim  Take 1 tablet (100 mg) by mouth once daily. For 90 days  Medication Adjustments for Surgery: Take/Use as prescribed     amLODIPine-benazepriL 5-10 mg capsule  Commonly known as: Lotrel  Take 1 capsule by mouth once daily.  Medication Adjustments for Surgery: Take last dose 1 day (24 hours) before surgery     Aspir-Low 81 mg EC tablet  Generic drug: aspirin  Additional Medication Adjustments for Surgery: Take last dose 7 days before surgery     atorvastatin 20 mg tablet  Commonly known as: Lipitor  Take 1 tablet (20 mg) by mouth once daily at bedtime. For 90 days  Medication Adjustments for Surgery: Take/Use as prescribed     lisinopril 20 mg tablet  Take 1 tablet (20 mg) by mouth once daily. For 90 days  Medication Adjustments for Surgery: Take last dose 1 day (24 hours) before surgery     tamsulosin 0.4 mg 24 hr capsule  Commonly known as: Flomax  Take 1 capsule (0.4 mg) by mouth once daily.  Medication Adjustments for Surgery: Take/Use as prescribed     traZODone 50 mg tablet  Commonly known as: Desyrel  Medication Adjustments for Surgery: Take/Use as prescribed                            Preoperative Brain  Exercises    What are brain exercises?  A brain exercise is any activity that engages your thinking (cognitive) skills.    What types of activities are considered brain exercises?  Jigsaw puzzles, crossword puzzles, word jumble, memory games, word search, and many more.  Many can be found free online or on your phone via a mobile omar.    Why should I do brain exercises before my surgery?  More recent research has shown brain exercise before surgery can lower the risk of postoperative delirium (confusion) which can be especially important for older adults.  Patients who did brain exercises for 5 to 10 hours the days before surgery, cut their risk of postoperative delirium in half up to 1 week after surgery.

## 2025-07-24 NOTE — CPM/PAT H&P
CPM/PAT Evaluation       Name: Anthony Cleaning (Anthony Cleaning)  /Age: 1950/74 y.o.     In-Person       Chief Complaint: Elevated PSA    HPI    Pt is a 74 year old male with an elevated PSA. Pt completed prostate MRI that showed:     A PI-RADS 5 lesion in the left base peripheral zone extending  into the adjacent transition zone and right peripheral zone.  Extracapsular extension with the left neurovascular bundle  involvement and involvement of the medial left seminal vesicle. No  rectal wall involvement.  2. Enlarged external iliac and prominent common iliac  lymphadenopathy, concerning for metastatic lymphadenopathy.    Pt reports history of BPH with occasional symptoms of urinary frequency and urinary urgency. Pt denies pelvic pain, urinary hesitancy, and gross hematuria. Pt was examined by his urologist and has been scheduled for prostate biopsy with imaging guidance. Pt denies CP, SOB, or dizziness.     Past Medical History:   Diagnosis Date    Abnormal blood sugar     Anxiety     BPH (benign prostatic hyperplasia)     with urinary frequency and urgency    Colon polyp See Dr Meyers    Removed and they were precancerous    Depression     Hearing aid worn     HL (hearing loss) Several years    I wear hearing aids    Hyperlipidemia     Hypertension Ongoing problem for several years    Insomnia     Leukocytosis     Murmur Few years ago    Personal history of other diseases of the circulatory system     History of hypertension    Personal history of other diseases of the nervous system and sense organs     History of Meniere's disease    Prostate cancer (Multi) This year    Seen on MRI recently    Transient global amnesia        Past Surgical History:   Procedure Laterality Date    ANKLE FRACTURE SURGERY Left     CATARACT EXTRACTION  Years ago    MR HEAD ANGIO WO IV CONTRAST  2018    MR HEAD ANGIO WO IV CONTRAST [facility]     Social History     Tobacco Use    Smoking status: Never      Passive exposure: Never    Smokeless tobacco: Never   Substance Use Topics    Alcohol use: Never     Social History     Substance and Sexual Activity   Drug Use Never     Patient  reports that he is not currently sexually active and has had partner(s) who are female. He reports using the following method of birth control/protection: None.    Family History[1]    Allergies   Allergen Reactions    Penicillins Hives and Rash     Current Outpatient Medications   Medication Sig Dispense Refill    traZODone (Desyrel) 50 mg tablet Take 0.5 tablets (25 mg) by mouth once daily at bedtime.      acetaminophen (Tylenol) 325 mg tablet 2 tablets (650 mg) every 4 hours if needed for mild pain (1 - 3) or fever (temp greater than 38.0 C).      allopurinol (Zyloprim) 100 mg tablet Take 1 tablet (100 mg) by mouth once daily. For 90 days 90 tablet 3    amLODIPine-benazepriL (Lotrel) 5-10 mg capsule Take 1 capsule by mouth once daily. 90 capsule 3    aspirin (Aspir-Low) 81 mg EC tablet Take 1 tablet (81 mg) by mouth once daily.      atorvastatin (Lipitor) 20 mg tablet Take 1 tablet (20 mg) by mouth once daily at bedtime. For 90 days 90 tablet 3    lisinopril 20 mg tablet Take 1 tablet (20 mg) by mouth once daily. For 90 days 90 tablet 3    tamsulosin (Flomax) 0.4 mg 24 hr capsule Take 1 capsule (0.4 mg) by mouth once daily. 90 capsule 3     No current facility-administered medications for this visit.     Review of Systems   Constitutional: Negative.    HENT: Negative.     Respiratory: Negative.     Cardiovascular: Negative.    Gastrointestinal: Negative.    Endocrine: Negative.    Genitourinary:  Positive for frequency and urgency.   Musculoskeletal: Negative.    Skin: Negative.    Neurological: Negative.    Hematological: Negative.    Psychiatric/Behavioral: Negative.       Physical Exam  Vitals reviewed.   Constitutional:       Appearance: Normal appearance. He is obese.   HENT:      Head: Normocephalic and atraumatic.      Nose: Nose  "normal.      Mouth/Throat:      Mouth: Mucous membranes are moist.      Pharynx: Oropharynx is clear.     Eyes:      Extraocular Movements: Extraocular movements intact.      Conjunctiva/sclera: Conjunctivae normal.      Pupils: Pupils are equal, round, and reactive to light.       Cardiovascular:      Rate and Rhythm: Normal rate and regular rhythm.      Pulses: Normal pulses.      Heart sounds: Normal heart sounds.   Pulmonary:      Effort: Pulmonary effort is normal. No respiratory distress.      Breath sounds: Normal breath sounds. No wheezing, rhonchi or rales.   Abdominal:      Palpations: Abdomen is soft.      Tenderness: There is no abdominal tenderness. There is no guarding or rebound.     Musculoskeletal:         General: Normal range of motion.      Cervical back: Normal range of motion and neck supple.      Left lower leg: Edema (trace edema) present.     Skin:     General: Skin is warm and dry.     Neurological:      General: No focal deficit present.      Mental Status: He is alert and oriented to person, place, and time. Mental status is at baseline.     Psychiatric:         Mood and Affect: Mood normal.         Behavior: Behavior normal.         Thought Content: Thought content normal.         Judgment: Judgment normal.          PAT AIRWAY:   Airway:     Mallampati::  II    TM distance::  >3 FB    Neck ROM::  Full      Visit Vitals  /78   Pulse 101   Temp 36.3 °C (97.3 °F) (Temporal)   Resp 16   Ht 1.676 m (5' 6\")   Wt 103 kg (227 lb)   SpO2 99%   BMI 36.64 kg/m²   Smoking Status Never   BSA 2.19 m²     ASA: 3   CHADS: 2.8%  RCRI: 0.4%  Ariscat: 1.6%  DASI Risk Score      Flowsheet Row Questionnaire Series Submission from 7/8/2025 in [facility] OR with Generic Provider Min   Can you take care of yourself (eat, dress, bathe, or use toilet)?  2.75  filed at 07/08/2025 1238   Can you walk indoors, such as around your house? 1.75  filed at 07/08/2025 1238   Can you walk a block " or two on level ground?  2.75  filed at 07/08/2025 1238   Can you climb a flight of stairs or walk up a hill? 5.5  filed at 07/08/2025 1238   Can you run a short distance? 0  filed at 07/08/2025 1238   Can you do light work around the house like dusting or washing dishes? 2.7  filed at 07/08/2025 1238   Can you do moderate work around the house like vacuuming, sweeping floors or carrying groceries? 3.5  filed at 07/08/2025 1238   Can you do heavy work around the house like scrubbing floors or lifting and moving heavy furniture?  8  filed at 07/08/2025 1238   Can you do yard work like raking leaves, weeding or pushing a mower? 0  filed at 07/08/2025 1238   Can you have sexual relations? 5.25  filed at 07/08/2025 1238   Can you participate in moderate recreational activities like golf, bowling, dancing, doubles tennis or throwing a baseball or football? 0  filed at 07/08/2025 1238   Can you participate in strenous sports like swimming, singles tennis, football, basketball, or skiing? 0  filed at 07/08/2025 1238   DASI SCORE 32.2  filed at 07/08/2025 1238   METS Score (Will be calculated only when all the questions are answered) 6.7  filed at 07/08/2025 1238     Caprini DVT Assessment    No data to display  Modified Frailty Index    No data to display  [id number] Stroke Risk Points  Current as of just now        N/A 0 to 9 Points:      Last Change: N/A          The TCQ7XO5-WEYy risk score (Lip GH, et al. 2009. © 2010 American College [employer]) quantifies the risk of stroke for a patient with atrial fibrillation. For patients without atrial fibrillation or under the age of 18 this score appears as N/A. Higher score values generally indicate higher risk of stroke.        This score is not applicable to this patient. Components are not calculated.          Revised Cardiac Risk Index      Flowsheet Row Pre-Admission Testing from 7/24/2025 in [facility]   High-Risk Surgery (Intraperitoneal,  Intrathoracic,Suprainguinal vascular) 0 filed at 07/24/2025 1033   History of ischemic heart disease (History of MI, History of positive exercuse test, Current chest paint considered due to myocardial ischemia, Use of nitrate therapy, ECG with pathological Q Waves) 0 filed at 07/24/2025 1033   History of congestive heart failure (pulmonary edemia, bilateral rales or S3 gallop, Paroxysmal nocturnal dyspnea, CXR showing pulmonary vascular redistribution) 0 filed at 07/24/2025 1033   History of cerebrovascular disease (Prior TIA or stroke) 0 filed at 07/24/2025 1033   Pre-operative insulin treatment 0 filed at 07/24/2025 1033   Pre-operative creatinine>2 mg/dl 0 filed at 07/24/2025 1033   Revised Cardiac Risk Calculator 0 filed at 07/24/2025 1033     Apfel Simplified Score    No data to display  Risk Analysis Index Results This Encounter    No data found in the last 10 encounters.       Stop Bang Score      Flowsheet Row Pre-Admission Testing from 7/24/2025 in [facility] Questionnaire Series Submission from 7/8/2025 in [facility] OR with Generic Provider Min   Do you snore loudly? 1 filed at 07/24/2025 0956 1  filed at 07/08/2025 1238   Do you often feel tired or fatigued after your sleep? 0 filed at 07/24/2025 0956 0  filed at 07/08/2025 1238   Has anyone ever observed you stop breathing in your sleep? 1 filed at 07/24/2025 0956 1  filed at 07/08/2025 1238   Do you have or are you being treated for high blood pressure? 1 filed at 07/24/2025 0956 1  filed at 07/08/2025 1238   Recent BMI (Calculated) 36.7 filed at 07/24/2025 0956 34.7  filed at 07/08/2025 1238   Is BMI greater than 35 kg/m2? 1=Yes filed at 07/24/2025 0956 0=No  filed at 07/08/2025 1238   Age older than 50 years old? 1=Yes filed at 07/24/2025 0956 1=Yes  filed at 07/08/2025 1238   Is your neck circumference greater than 17 inches (Male) or 16 inches (Female)? 1 filed at 07/24/2025 0956 --   Gender - Male 1=Yes filed  at 07/24/2025 0956 1=Yes  filed at 07/08/2025 1238   STOP-BANG Total Score 7 filed at 07/24/2025 0956 --     Prodigy: High Risk  Total Score: 20              Prodigy Age Score      Prodigy Gender Score          ARISCAT Score for Postoperative Pulmonary Complications    No data to display  [place] Perioperative Risk for Myocardial Infarction or Cardiac Arrest (NAYE)    No data to display      Assessment and Plan:     Elevated PSA: BIOPSY, PROSTATE, WITH IMAGING GUIDANCE   HTN: Pt is taking lisinopril and amlodipine-benazepril.   Hyperlipidemia: Pt is taking atorvastatin.   BPH: Pt is taking tamsulosin. Pt has history of occasional urinary frequency and occasional urinary urgency.   Leukocytosis: History of elevated WBC. Will check CBC in PAT. Pt stated he has not seen a hematologist but his current Doctor wants him to see one. Elevated WBC can be seen on blood work since 2018.   Trivial Tricuspid regurgitation: found on 2018 ECHO in [facility] cardiology. Pt saw Dr Ny in the past.   Anxiety/Depression: Pt stated he is grieving the loss of his wife who passed away one year ago.   Insomnia: pt is taking trazodone as needed.   Elevated blood sugars: Will check HgbA1C in PAT. Pt's last HgbA1C on 4/17/2025 was 7.3. Pt denies ever being diagnosed with Diabetes.   Gout: Pt is taking allopurinol.   Hearing loss/ Meniere's Disease: Pt wears bilateral hearing aids.   BMI: 36.64    CBC ordered in PAT.  CMP completed on 5/17/2025.  EKG performed in PAT.    Pt scheduled to see a hematologist on 7/31/2025 for pre op clearance. Pt has history of leukocytosis.  Pt found old records that his past PCP wanted him to see a hematologist and his current PCP recently told him she wanted him to see a hematologist.     LISA Mcdaniels    7/25/2025:  Pt is scheduled to see Dr Gil on 7/29/2025 for cardiac preop assessment d/t abnormal EKG. Pt was made aware.     LISA Mcdaniels         [1]   Family History  Problem  Relation Name Age of Onset    Heart disease Mother      Diabetes Mother      Early natural death Father Wally Cleaning 70 - 79

## 2025-07-25 LAB
ATRIAL RATE: 107 BPM
P AXIS: 33 DEGREES
P OFFSET: 169 MS
P ONSET: 125 MS
PR INTERVAL: 174 MS
Q ONSET: 212 MS
QRS COUNT: 17 BEATS
QRS DURATION: 144 MS
QT INTERVAL: 360 MS
QTC CALCULATION(BAZETT): 480 MS
QTC FREDERICIA: 436 MS
R AXIS: 232 DEGREES
T AXIS: 10 DEGREES
T OFFSET: 392 MS
VENTRICULAR RATE: 107 BPM

## 2025-07-29 ENCOUNTER — APPOINTMENT (OUTPATIENT)
Dept: CARDIOLOGY | Facility: CLINIC | Age: 75
End: 2025-07-29
Payer: MEDICARE

## 2025-07-29 ENCOUNTER — APPOINTMENT (OUTPATIENT)
Dept: LAB | Facility: HOSPITAL | Age: 75
End: 2025-07-29
Payer: MEDICARE

## 2025-07-29 VITALS
DIASTOLIC BLOOD PRESSURE: 58 MMHG | SYSTOLIC BLOOD PRESSURE: 119 MMHG | HEIGHT: 66 IN | OXYGEN SATURATION: 97 % | BODY MASS INDEX: 36.8 KG/M2 | WEIGHT: 229 LBS | HEART RATE: 93 BPM

## 2025-07-29 DIAGNOSIS — E78.2 MIXED HYPERLIPIDEMIA: ICD-10-CM

## 2025-07-29 DIAGNOSIS — D72.821 MONOCYTOSIS: ICD-10-CM

## 2025-07-29 DIAGNOSIS — R73.09 ELEVATED HEMOGLOBIN A1C: ICD-10-CM

## 2025-07-29 DIAGNOSIS — E11.9 TYPE 2 DIABETES MELLITUS WITHOUT COMPLICATION, WITHOUT LONG-TERM CURRENT USE OF INSULIN: ICD-10-CM

## 2025-07-29 DIAGNOSIS — I10 BENIGN ESSENTIAL HYPERTENSION: Primary | ICD-10-CM

## 2025-07-29 DIAGNOSIS — D72.829 LEUKOCYTOSIS, UNSPECIFIED TYPE: ICD-10-CM

## 2025-07-29 LAB
ALBUMIN SERPL BCP-MCNC: 4.3 G/DL (ref 3.4–5)
ALP SERPL-CCNC: 56 U/L (ref 33–136)
ALT SERPL W P-5'-P-CCNC: 23 U/L (ref 10–52)
ANION GAP SERPL CALCULATED.3IONS-SCNC: 11 MMOL/L (ref 10–20)
AST SERPL W P-5'-P-CCNC: 23 U/L (ref 9–39)
BASOPHILS # BLD AUTO: 0.04 X10*3/UL (ref 0–0.1)
BASOPHILS NFR BLD AUTO: 0.3 %
BILIRUB SERPL-MCNC: 0.6 MG/DL (ref 0–1.2)
BUN SERPL-MCNC: 21 MG/DL (ref 6–23)
CALCIUM SERPL-MCNC: 9.3 MG/DL (ref 8.6–10.3)
CHLORIDE SERPL-SCNC: 102 MMOL/L (ref 98–107)
CO2 SERPL-SCNC: 29 MMOL/L (ref 21–32)
CREAT SERPL-MCNC: 1.06 MG/DL (ref 0.5–1.3)
EGFRCR SERPLBLD CKD-EPI 2021: 74 ML/MIN/1.73M*2
EOSINOPHIL # BLD AUTO: 0.18 X10*3/UL (ref 0–0.4)
EOSINOPHIL NFR BLD AUTO: 1.4 %
ERYTHROCYTE [DISTWIDTH] IN BLOOD BY AUTOMATED COUNT: 14.2 % (ref 11.5–14.5)
GLUCOSE SERPL-MCNC: 137 MG/DL (ref 74–99)
HCT VFR BLD AUTO: 43 % (ref 41–52)
HGB BLD-MCNC: 14.5 G/DL (ref 13.5–17.5)
IMM GRANULOCYTES # BLD AUTO: 0.04 X10*3/UL (ref 0–0.5)
IMM GRANULOCYTES NFR BLD AUTO: 0.3 % (ref 0–0.9)
LDH SERPL L TO P-CCNC: 123 U/L (ref 84–246)
LYMPHOCYTES # BLD AUTO: 6.48 X10*3/UL (ref 0.8–3)
LYMPHOCYTES NFR BLD AUTO: 49.3 %
MCH RBC QN AUTO: 31.5 PG (ref 26–34)
MCHC RBC AUTO-ENTMCNC: 33.7 G/DL (ref 32–36)
MCV RBC AUTO: 94 FL (ref 80–100)
MONOCYTES # BLD AUTO: 0.69 X10*3/UL (ref 0.05–0.8)
MONOCYTES NFR BLD AUTO: 5.2 %
NEUTROPHILS # BLD AUTO: 5.72 X10*3/UL (ref 1.6–5.5)
NEUTROPHILS NFR BLD AUTO: 43.5 %
NRBC BLD-RTO: 0 /100 WBCS (ref 0–0)
PLATELET # BLD AUTO: 257 X10*3/UL (ref 150–450)
POTASSIUM SERPL-SCNC: 4.4 MMOL/L (ref 3.5–5.3)
PROT SERPL-MCNC: 6.7 G/DL (ref 6.4–8.2)
RBC # BLD AUTO: 4.6 X10*6/UL (ref 4.5–5.9)
SODIUM SERPL-SCNC: 138 MMOL/L (ref 136–145)
WBC # BLD AUTO: 13.2 X10*3/UL (ref 4.4–11.3)

## 2025-07-29 PROCEDURE — 1126F AMNT PAIN NOTED NONE PRSNT: CPT | Performed by: INTERNAL MEDICINE

## 2025-07-29 PROCEDURE — 88185 FLOWCYTOMETRY/TC ADD-ON: CPT

## 2025-07-29 PROCEDURE — 1159F MED LIST DOCD IN RCRD: CPT | Performed by: INTERNAL MEDICINE

## 2025-07-29 PROCEDURE — 1160F RVW MEDS BY RX/DR IN RCRD: CPT | Performed by: INTERNAL MEDICINE

## 2025-07-29 PROCEDURE — 85025 COMPLETE CBC W/AUTO DIFF WBC: CPT

## 2025-07-29 PROCEDURE — 83615 LACTATE (LD) (LDH) ENZYME: CPT

## 2025-07-29 PROCEDURE — 88184 FLOWCYTOMETRY/ TC 1 MARKER: CPT

## 2025-07-29 PROCEDURE — 80053 COMPREHEN METABOLIC PANEL: CPT

## 2025-07-29 PROCEDURE — 3008F BODY MASS INDEX DOCD: CPT | Performed by: INTERNAL MEDICINE

## 2025-07-29 PROCEDURE — 99205 OFFICE O/P NEW HI 60 MIN: CPT | Performed by: INTERNAL MEDICINE

## 2025-07-29 PROCEDURE — 1036F TOBACCO NON-USER: CPT | Performed by: INTERNAL MEDICINE

## 2025-07-29 PROCEDURE — G2211 COMPLEX E/M VISIT ADD ON: HCPCS | Performed by: INTERNAL MEDICINE

## 2025-07-29 PROCEDURE — 88189 FLOWCYTOMETRY/READ 16 & >: CPT

## 2025-07-29 PROCEDURE — 3078F DIAST BP <80 MM HG: CPT | Performed by: INTERNAL MEDICINE

## 2025-07-29 PROCEDURE — 3074F SYST BP LT 130 MM HG: CPT | Performed by: INTERNAL MEDICINE

## 2025-07-29 RX ORDER — METFORMIN HYDROCHLORIDE 500 MG/1
500 TABLET ORAL
Qty: 90 TABLET | Refills: 3 | Status: SHIPPED | OUTPATIENT
Start: 2025-07-29 | End: 2026-07-29

## 2025-07-29 RX ORDER — ATENOLOL AND CHLORTHALIDONE TABLET 100; 25 MG/1; MG/1
1 TABLET ORAL DAILY
Qty: 90 EACH | Refills: 3 | Status: SHIPPED | OUTPATIENT
Start: 2025-07-29 | End: 2026-07-29

## 2025-07-29 RX ORDER — METFORMIN HYDROCHLORIDE 500 MG/1
500 TABLET ORAL
Qty: 180 TABLET | Refills: 3 | Status: SHIPPED | OUTPATIENT
Start: 2025-07-29 | End: 2025-07-29

## 2025-07-29 ASSESSMENT — LIFESTYLE VARIABLES
HOW OFTEN DO YOU HAVE SIX OR MORE DRINKS ON ONE OCCASION: NEVER
SKIP TO QUESTIONS 9-10: 1
AUDIT-C TOTAL SCORE: 0
HOW MANY STANDARD DRINKS CONTAINING ALCOHOL DO YOU HAVE ON A TYPICAL DAY: PATIENT DOES NOT DRINK
HOW OFTEN DO YOU HAVE A DRINK CONTAINING ALCOHOL: NEVER

## 2025-07-29 ASSESSMENT — ENCOUNTER SYMPTOMS
DEPRESSION: 0
LOSS OF SENSATION IN FEET: 0
OCCASIONAL FEELINGS OF UNSTEADINESS: 0

## 2025-07-29 ASSESSMENT — PAIN SCALES - GENERAL: PAINLEVEL_OUTOF10: 0-NO PAIN

## 2025-07-29 NOTE — PROGRESS NOTES
[facility] [place]        Subjective   Chief Complaint   Patient presents with    New Patient Visit    Pre-op Clearance      74-year-old patient with a multiple comorbid condition history of upcoming prostate biopsy surgery.  Also history of colon polyp.  Patient had a leaky valve by echocardiogram.  Has seen the primary cardiologist.  Now here for second opinion.  Patient currently on multiple blood pressure medication.  Also statin therapy.  Electrocardiogram was done about 2 weeks ago showed sinus tachycardia rate about 107 with right bundle EKG with possible lateral infarct with a questionable old inferior infarct.  I reviewed EKG.  Patient was echocardiogram done in the office which I do not have a report on that.  Echo had in 2018 essentially showed underlying normal ejection fraction with diastolic dysfunction and borderline LVH with trivial tricuspid regurgitation seen about in 2018.  Patient is fairly active.  Walk at least more than 500 to 1000 feet without any symptoms.  Here for preop clearance.  Hemoglobin A1c was done about 5 days ago 7%.  CBC was done about 5 days ago elevated white count otherwise unremarkable.  B12 was normal.  Lipid profile was done last year total cholesterol 109 with LDL is 51.  Comprehensive mammogram done she elevated glucose otherwise unremarkable.    He has a past medical history of Abnormal blood sugar, Abnormal ECG (7/24/2025), Anxiety, BPH (benign prostatic hyperplasia), Colon polyp (See Dr Meyers), Depression, Hearing aid worn, Heart valve disease (2018), HL (hearing loss) (Several years), Hyperlipidemia, Hypertension (Ongoing problem for several years), Insomnia, Leukocytosis, Murmur (Few years ago), Personal history of other diseases of the circulatory system, Personal history of other diseases of the nervous system and sense organs, Prostate cancer (Multi) (This year), and Transient global amnesia.  He has a past surgical  history that includes MR angio head wo IV contrast (04/30/2018); Cataract extraction (Years ago); and Ankle fracture surgery (Left, 2008).   No relevant family history has been documented for this patient.  Current Outpatient Medications   Medication Sig Dispense Refill    acetaminophen (Tylenol) 325 mg tablet 2 tablets (650 mg) every 4 hours if needed for mild pain (1 - 3) or fever (temp greater than 38.0 C).      allopurinol (Zyloprim) 100 mg tablet Take 1 tablet (100 mg) by mouth once daily. For 90 days 90 tablet 3    amLODIPine-benazepriL (Lotrel) 5-10 mg capsule Take 1 capsule by mouth once daily. 90 capsule 3    aspirin (Aspir-Low) 81 mg EC tablet Take 1 tablet (81 mg) by mouth once daily.      atorvastatin (Lipitor) 20 mg tablet Take 1 tablet (20 mg) by mouth once daily at bedtime. For 90 days 90 tablet 3    lisinopril 20 mg tablet Take 1 tablet (20 mg) by mouth once daily. For 90 days 90 tablet 3    tamsulosin (Flomax) 0.4 mg 24 hr capsule Take 1 capsule (0.4 mg) by mouth once daily. 90 capsule 3    traZODone (Desyrel) 50 mg tablet Take 0.5 tablets (25 mg) by mouth once daily at bedtime.      metFORMIN (Glucophage) 500 mg tablet Take 1 tablet (500 mg) by mouth 2 times daily (morning and late afternoon). 180 tablet 3     No current facility-administered medications for this visit.      reports that he has never smoked. He has never been exposed to tobacco smoke. He has never used smokeless tobacco. He reports that he does not drink alcohol and does not use drugs.  Allergies:  Penicillins    ROS: See HPI  CONSTITUTIONAL: Chills- none. Fever- none. Weight change appropriate for age.  HEENT: Headache- Negative.  Change in vision- none.  Ear pain- none. Nasal congestion- none. Post-nasal drip-none.  Sore throat-none.  CARDIOLOGY: Chest pain- none.  Leg edema-trace.  Murmurs-soft systolic.  Palpitation- none.  RESPIRATORY: Denies any shortness of breath.  GI: Abdominal pain- none.  Change in bowel habits- none.   "Constipation- none.  Diarrhea- none.  Nausea- none.  Vomiting- none.  MUSCULOSKELETAL: Joint pain- none.  Muscle aches- none.  DERMATOLOGY: Rash- none.  NEUROLOGY: Dizziness- none.   Headache- none.  PSYCHIATRY: Denies any depression or anxiety     Vitals:    07/29/25 0838   BP: 119/58   Pulse: 93   SpO2: 97%   Weight: 104 kg (229 lb)   Height: 1.676 m (5' 6\")   PainSc: 0-No pain      BMI:Body mass index is 36.96 kg/m².   General Cardiology:  General Appearance: Alert, oriented and in no acute distress.  HEENT: extra ocular movements intact (EOMI), pupils equal,  round, reactive to light and accommodation (PERRLA).  Carotid Upstroke: no bruit, normal.  Jugular Venous Distention (JVD): flat.  Chest: normal.  Lungs: Clear to auscultation,   Heart Sounds: no S3 or S4, normal S1, S2, regular rate.  Murmur, Click, Gallop: soft systolic murmur.  Abdomen: no hepatomegaly, no masses felt, soft.  Extremities: no leg edema.  Peripheral pulses: 2 plus bilateral.  NEUROLOGY Cranial nerves II-XII grossly intact.     Last Labs:  CMP:  Recent Labs     05/17/24  0835 12/03/21  1348 12/14/18  1535    139 136   K 4.3 4.3 4.0    98 94*   CO2 28 23* 29   ANIONGAP 11 18 13   BUN 17 20 17   CREATININE 0.99 1.2 1.0   EGFR 80 63 79   GLUCOSE 194* 124* 107*     Recent Labs     05/17/24  0835 12/03/21  1348 12/14/18  1535   ALBUMIN 4.7 4.3 4.8   ALKPHOS 48 64 59   ALT 39 42* 51*   AST 33 43* 50*   BILITOT 0.5 0.7 0.4     CBC:  Recent Labs     07/24/25  1016 05/17/24  0835 12/03/21  1348   WBC 15.8* 16.8* 16.0*   HGB 15.4 15.5 15.6   HCT 46.2 47.1 47.1    300 300   MCV 94 97 96.9     COAG:   Recent Labs     04/29/18  1302   INR 1.0     HEME/ENDO:  Recent Labs     07/24/25  1016 05/17/24  0835 12/03/21  1348 07/13/18  1017 04/30/18  0545   TSH  --  1.77 3.41 1.91  --    HGBA1C 7.0*  --  8.0*  --  6.0      CARDIAC: No results for input(s): \"LDH\", \"CKMB\", \"TROPHS\", \"BNP\" in the last 11329 hours.    No lab exists for component: " "\"CK\", \"CKMBP\"  Recent Labs     05/17/24  0835 12/03/21  1348 07/13/18  1017   CHOL 109 108* 107*   LDLCALC 51 49* 52*   HDL 32.8 31* 38*   TRIG 124 138 86       Last Cardiology Tests:  Echo:  Echo Results:  No results found for this or any previous visit from the past 3650 days.     Cath:  Stress Test:  Stress Results:  No results found for this or any previous visit from the past 365 days.     Cardiac Imaging:    Problem List Items Addressed This Visit       Benign essential hypertension - Primary    Relevant Medications    metFORMIN (Glucophage) 500 mg tablet    Mixed hyperlipidemia    Relevant Medications    metFORMIN (Glucophage) 500 mg tablet    Elevated hemoglobin A1c    Relevant Medications    metFORMIN (Glucophage) 500 mg tablet    Type 2 diabetes mellitus without complication, without long-term current use of insulin    Relevant Medications    metFORMIN (Glucophage) 500 mg tablet      74-year-old patient with a multiple comorbid condition.  Now with upcoming prostate biopsy surgery fairly active no active chest pain tightness.  1.  Benign hypertension: Currently on multiple medication including Lotrel, lisinopril, aspirin.  Goal to keep blood pressure below 125/75.  2.  Mixed hyperlipidemia: Continue current atorvastatin 20 mg tablet daily.  3.  Type 2 diabetes/early hemoglobin A1c: Elevated hemoglobin A1c 8% now 7%.  Advised patient to lose weight loss as well as added metformin 500 mg tablet p.o. once a day.  Goal to keep hemoglobin A1c less than 6%.  Advised patient to see primary care doctor.  4.  Preop clearance: /Patient stable cardiac wise, continue beta-blocker or rate control medication.  Moderate risk for CV events during the surgery.  No contraindication for surgery.  Hold blood thinner 48 hours before surgery.  5.  Sinus tachycardia: Patient with elevated heart rate 90s probably benefit from rate control medication I will discontinue lisinopril and start patient on atenolol 100/25 mg once a " day.    Advised patient to avoid lunch meats, canned soups, pizzas, bread rolls, and sandwiches. Advised patient to limit salt intake 1,500 mg daily. Advised patient to exercise 30 mins/3 times a week including treadmill or aerobic type, Goal to achieve 65% target HR.    Diet and exercise reviewed with patient..advice to walk about 10,000 steps or about 2 hours during day time. Cut back on salt, sugar and flour.    Pt. care time is spent includes independent review of diagnostic tests, labs, radiographs, EKGs and coordination of care. Assessment, impression and plans are reflected in the note above as well as the orders.    Tam Gil MD  [facility] [place]  [facility]

## 2025-07-29 NOTE — LETTER
July 29, 2025     Jay Walter MD  [id number] [facility] [address]  [place] [address]    Patient: Anthony Cleaning   YOB: 1950   Date of Visit: 7/29/2025       Dear Dr. Jay Walter MD:    Thank you for referring Anthony Cleaning to me for evaluation. Below are my notes for this consultation.  If you have questions, please do not hesitate to call me. I look forward to following your patient along with you.  Patient stable cardiac wise, continue beta-blocker or rate control medication.  Moderate risk for CV events during the surgery.  No contraindication for surgery.  Hold blood thinner 48 hours before surgery.     Sincerely,     Tam Gil MD      CC: No Recipients  ______________________________________________________________________________________      [facility] [place]        Subjective  Chief Complaint   Patient presents with   • New Patient Visit   • Pre-op Clearance      74-year-old patient with a multiple comorbid condition history of upcoming prostate biopsy surgery.  Also history of colon polyp.  Patient had a leaky valve by echocardiogram.  Has seen the primary cardiologist.  Now here for second opinion.  Patient currently on multiple blood pressure medication.  Also statin therapy.  Electrocardiogram was done about 2 weeks ago showed sinus tachycardia rate about 107 with right bundle EKG with possible lateral infarct with a questionable old inferior infarct.  I reviewed EKG.  Patient was echocardiogram done in the office which I do not have a report on that.  Echo had in 2018 essentially showed underlying normal ejection fraction with diastolic dysfunction and borderline LVH with trivial tricuspid regurgitation seen about in 2018.  Patient is fairly active.  Walk at least more than 500 to 1000 feet without any symptoms.  Here for preop clearance.  Hemoglobin A1c was done about 5 days ago 7%.  CBC was done about 5 days ago  elevated white count otherwise unremarkable.  B12 was normal.  Lipid profile was done last year total cholesterol 109 with LDL is 51.  Comprehensive mammogram done she elevated glucose otherwise unremarkable.    He has a past medical history of Abnormal blood sugar, Abnormal ECG (7/24/2025), Anxiety, BPH (benign prostatic hyperplasia), Colon polyp (See Dr Meyers), Depression, Hearing aid worn, Heart valve disease (2018), HL (hearing loss) (Several years), Hyperlipidemia, Hypertension (Ongoing problem for several years), Insomnia, Leukocytosis, Murmur (Few years ago), Personal history of other diseases of the circulatory system, Personal history of other diseases of the nervous system and sense organs, Prostate cancer (Multi) (This year), and Transient global amnesia.  He has a past surgical history that includes MR angio head wo IV contrast (04/30/2018); Cataract extraction (Years ago); and Ankle fracture surgery (Left, 2008).   No relevant family history has been documented for this patient.  Current Outpatient Medications   Medication Sig Dispense Refill   • acetaminophen (Tylenol) 325 mg tablet 2 tablets (650 mg) every 4 hours if needed for mild pain (1 - 3) or fever (temp greater than 38.0 C).     • allopurinol (Zyloprim) 100 mg tablet Take 1 tablet (100 mg) by mouth once daily. For 90 days 90 tablet 3   • amLODIPine-benazepriL (Lotrel) 5-10 mg capsule Take 1 capsule by mouth once daily. 90 capsule 3   • aspirin (Aspir-Low) 81 mg EC tablet Take 1 tablet (81 mg) by mouth once daily.     • atorvastatin (Lipitor) 20 mg tablet Take 1 tablet (20 mg) by mouth once daily at bedtime. For 90 days 90 tablet 3   • lisinopril 20 mg tablet Take 1 tablet (20 mg) by mouth once daily. For 90 days 90 tablet 3   • tamsulosin (Flomax) 0.4 mg 24 hr capsule Take 1 capsule (0.4 mg) by mouth once daily. 90 capsule 3   • traZODone (Desyrel) 50 mg tablet Take 0.5 tablets (25 mg) by mouth once daily at bedtime.     • metFORMIN  "(Glucophage) 500 mg tablet Take 1 tablet (500 mg) by mouth 2 times daily (morning and late afternoon). 180 tablet 3     No current facility-administered medications for this visit.      reports that he has never smoked. He has never been exposed to tobacco smoke. He has never used smokeless tobacco. He reports that he does not drink alcohol and does not use drugs.  Allergies:  Penicillins    ROS: See HPI  CONSTITUTIONAL: Chills- none. Fever- none. Weight change appropriate for age.  HEENT: Headache- Negative.  Change in vision- none.  Ear pain- none. Nasal congestion- none. Post-nasal drip-none.  Sore throat-none.  CARDIOLOGY: Chest pain- none.  Leg edema-trace.  Murmurs-soft systolic.  Palpitation- none.  RESPIRATORY: Denies any shortness of breath.  GI: Abdominal pain- none.  Change in bowel habits- none.  Constipation- none.  Diarrhea- none.  Nausea- none.  Vomiting- none.  MUSCULOSKELETAL: Joint pain- none.  Muscle aches- none.  DERMATOLOGY: Rash- none.  NEUROLOGY: Dizziness- none.   Headache- none.  PSYCHIATRY: Denies any depression or anxiety     Vitals:    07/29/25 0838   BP: 119/58   Pulse: 93   SpO2: 97%   Weight: 104 kg (229 lb)   Height: 1.676 m (5' 6\")   PainSc: 0-No pain      BMI:Body mass index is 36.96 kg/m².   General Cardiology:  General Appearance: Alert, oriented and in no acute distress.  HEENT: extra ocular movements intact (EOMI), pupils equal,  round, reactive to light and accommodation (PERRLA).  Carotid Upstroke: no bruit, normal.  Jugular Venous Distention (JVD): flat.  Chest: normal.  Lungs: Clear to auscultation,   Heart Sounds: no S3 or S4, normal S1, S2, regular rate.  Murmur, Click, Gallop: soft systolic murmur.  Abdomen: no hepatomegaly, no masses felt, soft.  Extremities: no leg edema.  Peripheral pulses: 2 plus bilateral.  NEUROLOGY Cranial nerves II-XII grossly intact.     Last Labs:  CMP:  Recent Labs     05/17/24  0835 12/03/21  1348 12/14/18  1535    139 136   K 4.3 4.3 " "4.0    98 94*   CO2 28 23* 29   ANIONGAP 11 18 13   BUN 17 20 17   CREATININE 0.99 1.2 1.0   EGFR 80 63 79   GLUCOSE 194* 124* 107*     Recent Labs     05/17/24  0835 12/03/21  1348 12/14/18  1535   ALBUMIN 4.7 4.3 4.8   ALKPHOS 48 64 59   ALT 39 42* 51*   AST 33 43* 50*   BILITOT 0.5 0.7 0.4     CBC:  Recent Labs     07/24/25  1016 05/17/24  0835 12/03/21  1348   WBC 15.8* 16.8* 16.0*   HGB 15.4 15.5 15.6   HCT 46.2 47.1 47.1    300 300   MCV 94 97 96.9     COAG:   Recent Labs     04/29/18  1302   INR 1.0     HEME/ENDO:  Recent Labs     07/24/25  1016 05/17/24  0835 12/03/21  1348 07/13/18  1017 04/30/18  0545   TSH  --  1.77 3.41 1.91  --    HGBA1C 7.0*  --  8.0*  --  6.0      CARDIAC: No results for input(s): \"LDH\", \"CKMB\", \"TROPHS\", \"BNP\" in the last 15657 hours.    No lab exists for component: \"CK\", \"CKMBP\"  Recent Labs     05/17/24  0835 12/03/21  1348 07/13/18  1017   CHOL 109 108* 107*   LDLCALC 51 49* 52*   HDL 32.8 31* 38*   TRIG 124 138 86       Last Cardiology Tests:  Echo:  Echo Results:  No results found for this or any previous visit from the past 3650 days.     Cath:  Stress Test:  Stress Results:  No results found for this or any previous visit from the past 365 days.     Cardiac Imaging:    Problem List Items Addressed This Visit       Benign essential hypertension - Primary    Relevant Medications    metFORMIN (Glucophage) 500 mg tablet    Mixed hyperlipidemia    Relevant Medications    metFORMIN (Glucophage) 500 mg tablet    Elevated hemoglobin A1c    Relevant Medications    metFORMIN (Glucophage) 500 mg tablet    Type 2 diabetes mellitus without complication, without long-term current use of insulin    Relevant Medications    metFORMIN (Glucophage) 500 mg tablet      74-year-old patient with a multiple comorbid condition.  Now with upcoming prostate biopsy surgery fairly active no active chest pain tightness.  1.  Benign hypertension: Currently on multiple medication including " Lotrel, lisinopril, aspirin.  Goal to keep blood pressure below 125/75.  2.  Mixed hyperlipidemia: Continue current atorvastatin 20 mg tablet daily.  3.  Type 2 diabetes/early hemoglobin A1c: Elevated hemoglobin A1c 8% now 7%.  Advised patient to lose weight loss as well as added metformin 500 mg tablet p.o. once a day.  Goal to keep hemoglobin A1c less than 6%.  Advised patient to see primary care doctor.  4.  Preop clearance: /Patient stable cardiac wise, continue beta-blocker or rate control medication.  Moderate risk for CV events during the surgery.  No contraindication for surgery.  Hold blood thinner 48 hours before surgery.  5.  Sinus tachycardia: Patient with elevated heart rate 90s probably benefit from rate control medication I will discontinue lisinopril and start patient on atenolol 100/25 mg once a day.    Advised patient to avoid lunch meats, canned soups, pizzas, bread rolls, and sandwiches. Advised patient to limit salt intake 1,500 mg daily. Advised patient to exercise 30 mins/3 times a week including treadmill or aerobic type, Goal to achieve 65% target HR.    Diet and exercise reviewed with patient..advice to walk about 10,000 steps or about 2 hours during day time. Cut back on salt, sugar and flour.    Pt. care time is spent includes independent review of diagnostic tests, labs, radiographs, EKGs and coordination of care. Assessment, impression and plans are reflected in the note above as well as the orders.    Tam Gil MD  [facility] [place]  [facility]

## 2025-07-31 ENCOUNTER — OFFICE VISIT (OUTPATIENT)
Dept: HEMATOLOGY/ONCOLOGY | Facility: CLINIC | Age: 75
End: 2025-07-31
Payer: MEDICARE

## 2025-07-31 VITALS
HEART RATE: 77 BPM | RESPIRATION RATE: 18 BRPM | SYSTOLIC BLOOD PRESSURE: 117 MMHG | TEMPERATURE: 97 F | OXYGEN SATURATION: 100 % | WEIGHT: 226.63 LBS | DIASTOLIC BLOOD PRESSURE: 76 MMHG | BODY MASS INDEX: 36.58 KG/M2

## 2025-07-31 DIAGNOSIS — D72.829 LEUKOCYTOSIS, UNSPECIFIED TYPE: ICD-10-CM

## 2025-07-31 DIAGNOSIS — D72.821 MONOCYTOSIS: Primary | ICD-10-CM

## 2025-07-31 PROCEDURE — 1159F MED LIST DOCD IN RCRD: CPT | Performed by: STUDENT IN AN ORGANIZED HEALTH CARE EDUCATION/TRAINING PROGRAM

## 2025-07-31 PROCEDURE — 1126F AMNT PAIN NOTED NONE PRSNT: CPT | Performed by: STUDENT IN AN ORGANIZED HEALTH CARE EDUCATION/TRAINING PROGRAM

## 2025-07-31 PROCEDURE — 99204 OFFICE O/P NEW MOD 45 MIN: CPT | Performed by: STUDENT IN AN ORGANIZED HEALTH CARE EDUCATION/TRAINING PROGRAM

## 2025-07-31 PROCEDURE — 3078F DIAST BP <80 MM HG: CPT | Performed by: STUDENT IN AN ORGANIZED HEALTH CARE EDUCATION/TRAINING PROGRAM

## 2025-07-31 PROCEDURE — 3074F SYST BP LT 130 MM HG: CPT | Performed by: STUDENT IN AN ORGANIZED HEALTH CARE EDUCATION/TRAINING PROGRAM

## 2025-07-31 PROCEDURE — 99214 OFFICE O/P EST MOD 30 MIN: CPT | Performed by: STUDENT IN AN ORGANIZED HEALTH CARE EDUCATION/TRAINING PROGRAM

## 2025-07-31 RX ORDER — BISMUTH SUBSALICYLATE 262 MG
1 TABLET,CHEWABLE ORAL DAILY
COMMUNITY

## 2025-07-31 ASSESSMENT — PAIN SCALES - GENERAL: PAINLEVEL_OUTOF10: 0-NO PAIN

## 2025-07-31 NOTE — PROGRESS NOTES
Left message with Herminia nurses aid at [facility] [phone] requesting a call back from facility nurse.    Patient here for  new patient visit with Dr. Avalos for surgical clearance.  Patient here with his daughter.     Medications and Allergies reviewed and reconciled this visit.    No concerns or complaints noted at this time.     Pt reports appetite is good  .     Labs reviewed.  Follow up per MD request in one week via virtual visit.     Pt. reports availability and use of mychart, Reviewed this is a good place to communicate with the team as well as review labs and upcoming orders.     No barriers to education noted, patient agrees to current plan and verbalized understanding using teach back method.

## 2025-08-01 LAB
CELL COUNT (BLOOD): 13.2 X10*3/UL
CELL POPULATIONS: NORMAL
DIAGNOSIS: NORMAL
FLOW DIFFERENTIAL: NORMAL
FLOW TEST ORDERED: NORMAL
LAB TEST METHOD: NORMAL
NUMBER OF CELLS COLLECTED: NORMAL PER TUBE
PATH REPORT.TOTAL CANCER: NORMAL
SIGNATURE COMMENT: NORMAL
SPECIMEN VIABILITY: NORMAL

## 2025-08-05 NOTE — PROGRESS NOTES
Patient ID: Anthony Cleaning is a 74 y.o. male.  Referring Physician: Liv Avalos MD  [address] [place] [address]  [facility] [address]  [place] [address]  Primary Care Provider: Mata Oleary DO  Referral Reason: surgery clearance    HPI:  Anthony is a 74 years old male with diabetes, hypertension and urinary retention, who came in for surgery clearance as he needs prostate biopsy by urology. Patient reports that he was found to have elevated PSA level and MRI prostate scan showing PI-RADS 5 lesion. He needs prostate biopsy for tissue diagnosis. During work up, he was found to have leukocytosis and needs hematology clearance to go to the procedure. Reviewing labs, WBC appears to be elevated since 2018 ranging 13-17, mainly ALC highest at 9.09, and neutrophil at 6. Normal hemoglobin and platelet count. He denies B symptoms, fatigue, weight loss, poor appetite, night sweats, palpable lymph node or frequent infection.       Past Medical History: Medical History[1]  Social History:   Social History     Socioeconomic History    Marital status:      Spouse name: asha    Number of children: 1    Years of education: Not on file    Highest education level: Not on file   Occupational History    Not on file   Tobacco Use    Smoking status: Never     Passive exposure: Never    Smokeless tobacco: Never   Vaping Use    Vaping status: Never Used   Substance and Sexual Activity    Alcohol use: Never    Drug use: Never    Sexual activity: Not Currently     Partners: Female     Birth control/protection: None   Other Topics Concern    Not on file   Social History Narrative    Not on file     Social Drivers of Health     Financial Resource Strain: Low Risk  (5/8/2025)    Received from [facility]    Overall Financial Resource Strain (CARDIA)     Difficulty of Paying Living Expenses: Not very hard   Food Insecurity: No Food Insecurity (5/8/2025)    Received from [facility]    Hunger Vital Sign     Within the past 12 months, you worried that your  food would run out before you got the money to buy more.: Never true     Within the past 12 months, the food you bought just didn't last and you didn't have money to get more.: Never true   Transportation Needs: No Transportation Needs (5/8/2025)    Received from [facility]    PRAPARE - Transportation     Lack of Transportation (Medical): No     Lack of Transportation (Non-Medical): No   Physical Activity: Insufficiently Active (5/8/2025)    Received from [facility]    Exercise Vital Sign     On average, how many days per week do you engage in moderate to strenuous exercise (like a brisk walk)?: 2 days     On average, how many minutes do you engage in exercise at this level?: 30 min   Stress: Stress Concern Present (5/8/2025)    Received from [facility] - Occupational Stress Questionnaire     Feeling of Stress : To some extent   Social Connections: Moderately Integrated (5/8/2025)    Received from [facility]    Social Connection and Isolation Panel     In a typical week, how many times do you talk on the phone with family, friends, or neighbors?: More than three times a week     How often do you get together with friends or relatives?: More than three times a week     How often do you attend Scientologist or Anabaptism services?: More than 4 times per year     Do you belong to any clubs or organizations such as Scientologist groups, unions, fraternal or athletic groups, or school groups?: Yes     How often do you attend meetings of the clubs or organizations you belong to?: More than 4 times per year     Are you , , , , never , or living with a partner?:    Intimate Partner Violence: Not At Risk (5/8/2025)    Received from [facility]    Humiliation, Afraid, Rape, and Kick questionnaire     Within the last year, have you been afraid of your partner or ex-partner?: No     Within the last year, have you been humiliated or  emotionally abused in other ways by your partner or ex-partner?: No     Within the last year, have you been kicked, hit, slapped, or otherwise physically hurt by your partner or ex-partner?: No     Within the last year, have you been raped or forced to have any kind of sexual activity by your partner or ex-partner?: No   Housing Stability: Unknown (5/8/2025)    Received from [facility]    Housing Stability Vital Sign     In the last 12 months, was there a time when you were not able to pay the mortgage or rent on time?: No     Number of Times Moved in the Last Year: Not on file     At any time in the past 12 months, were you homeless or living in a shelter (including now)?: No     Surgical History: Surgical History[2]  Family History: Family History[3]   reports that he has never smoked. He has never been exposed to tobacco smoke. He has never used smokeless tobacco.  Oncology Family history: Cancer-related family history is not on file.    Review Of Systems:  General: no fatigue, weight change, appetite change  Ears/Nose/Mouth/Throat: no mouth sore, no hearing loss, no nasal congestion, no sore throat  Cardiovascular: no chest pain, no shortness of breath, no palpitation  Pulmonary: no cough, no wheezing, no hemoptysis  GI: no nausea, no vomiting, no diarrhea or constipation, no bleeding per rectum  Neurological: no dizziness, no seizure, no weakness, no sensation change  Musculoskeletal: no joint swelling, no bone pain, no back pain  Skin: no skin rash, no bruising, no skin lesion      Physical Exam:  /76 (BP Location: Left arm, Patient Position: Sitting, BP Cuff Size: Adult long)   Pulse 77   Temp 36.1 °C (97 °F) (Temporal)   Resp 18   Wt 103 kg (226 lb 10.1 oz)   SpO2 100%   BMI 36.58 kg/m²   BSA: 2.19 meters squared  General: awake/alert/oriented x3, no distress  Head: atraumatic. Symmetric facial expressions  Eyes: PERRL, EOMI, clear sclera.  Ears/Nose/Mouth/Throat:  Oral mucous membranes  moist. No oral ulcers  Neck: No palpable cervical chain lymph nodes  Respiratory: unlabored breathing on room air, good chest expansion, clear breath sounds on both sides, no ronchi  Cardio: Regular rate and rhythm, normal S1 and S2, radial pulses symmetric  GI: Nondistended, soft, non-tender abdomen  Musculoskeletal: Normal muscle bulk and tone, ROM intact, no joint swelling.  Extremities: No pedal edema, no arm or leg wounds  Neuro: Alert, cognition intact, speech normal. No motor deficits noted. Sensation intact to touch and hot/cold.   Psychological: Appropriate mood and behavior.  Skin: Warm and dry, no lesions, no rashes    Results:  Diagnostic Results   Lab Results   Component Value Date    WBC 13.2 (H) 07/29/2025    HGB 14.5 07/29/2025    HCT 43.0 07/29/2025    MCV 94 07/29/2025     07/29/2025     Lab Results   Component Value Date    CALCIUM 9.3 07/29/2025     07/29/2025    K 4.4 07/29/2025    CO2 29 07/29/2025     07/29/2025    BUN 21 07/29/2025    CREATININE 1.06 07/29/2025    ALT 23 07/29/2025    AST 23 07/29/2025     Current Medications[4]     Radiology:  [unfilled]     Pathology:    Assessment/Plan:    Leukocytosis predominantly lymphocytosis  - Reviewing labs, WBC appears to be elevated since 2018 ranging 13-17, mainly ALC highest at 9.09, and neutrophil at 6. Normal hemoglobin and platelet count.  - no B symptoms, no palpable Lymphadenopathy and organomegaly on exam  - will do labs CBC, CMP, LDH, flow cytometry   - due to elevated PSA level and MRI prostate scan showing PI-RADS 5 lesion, patient is scheduled for prostate biopsy. He is cleared for procedure from hematology standpoint  - follow up in 4 weeks    Performance Status: Asymptomatic    I spent more than 60 minutes for the patient today, including face-to-face conversation, pre-visit preparation, post-visit orders, and others.   Liv Avalos MD                                [1]   Past Medical History:  Diagnosis Date     Abnormal blood sugar     Abnormal ECG 7/24/2025    Anxiety     BPH (benign prostatic hyperplasia)     with urinary frequency and urgency    Colon polyp See Dr Meyers    Removed and they were precancerous    Depression     Hearing aid worn     Heart valve disease 2018    HL (hearing loss) Several years    I wear hearing aids    Hyperlipidemia     Hypertension Ongoing problem for several years    Insomnia     Leukocytosis     Murmur Few years ago    Personal history of other diseases of the circulatory system     History of hypertension    Personal history of other diseases of the nervous system and sense organs     History of Meniere's disease    Prostate cancer (Multi) This year    Seen on MRI recently    Transient global amnesia    [2]   Past Surgical History:  Procedure Laterality Date    ANKLE FRACTURE SURGERY Left 2008    CATARACT EXTRACTION  Years ago    MR HEAD ANGIO WO IV CONTRAST  04/30/2018    MR HEAD ANGIO WO IV CONTRAST [facility]PATIENT LEGAC[facility]   [3]   Family History  Problem Relation Name Age of Onset    Heart disease Mother      Diabetes Mother      Early natural death Father Wally Cleaning 70 - 79    Diabetes type II Mother Renu Randall Cleaning 40 - 49    Heart attack Father Wally Garzaer 70 - 79   [4]   Current Outpatient Medications:     acetaminophen (Tylenol) 325 mg tablet, 2 tablets (650 mg) every 4 hours if needed for mild pain (1 - 3) or fever (temp greater than 38.0 C)., Disp: , Rfl:     allopurinol (Zyloprim) 100 mg tablet, Take 1 tablet (100 mg) by mouth once daily. For 90 days, Disp: 90 tablet, Rfl: 3    amLODIPine-benazepriL (Lotrel) 5-10 mg capsule, Take 1 capsule by mouth once daily., Disp: 90 capsule, Rfl: 3    aspirin (Aspir-Low) 81 mg EC tablet, Take 1 tablet (81 mg) by mouth once daily., Disp: , Rfl:     atenoloL-chlorthalidone (Tenoretic 100) 100-25 mg tablet, Take 1 tablet by mouth once daily., Disp: 90 each, Rfl: 3    atorvastatin (Lipitor) 20 mg tablet, Take 1 tablet (20 mg) by mouth once daily at  bedtime. For 90 days, Disp: 90 tablet, Rfl: 3    metFORMIN (Glucophage) 500 mg tablet, Take 1 tablet (500 mg) by mouth once daily with breakfast., Disp: 90 tablet, Rfl: 3    tamsulosin (Flomax) 0.4 mg 24 hr capsule, Take 1 capsule (0.4 mg) by mouth once daily., Disp: 90 capsule, Rfl: 3    traZODone (Desyrel) 50 mg tablet, Take 0.5 tablets (25 mg) by mouth once daily at bedtime., Disp: , Rfl:     multivitamin tablet, Take 1 tablet by mouth once daily., Disp: , Rfl:

## 2025-08-06 ENCOUNTER — ANESTHESIA (OUTPATIENT)
Dept: OPERATING ROOM | Facility: HOSPITAL | Age: 75
End: 2025-08-06
Payer: MEDICARE

## 2025-08-06 ENCOUNTER — HOSPITAL ENCOUNTER (OUTPATIENT)
Facility: HOSPITAL | Age: 75
Setting detail: OUTPATIENT SURGERY
Discharge: HOME | End: 2025-08-06
Attending: UROLOGY | Admitting: UROLOGY
Payer: MEDICARE

## 2025-08-06 ENCOUNTER — ANESTHESIA EVENT (OUTPATIENT)
Dept: OPERATING ROOM | Facility: HOSPITAL | Age: 75
End: 2025-08-06
Payer: MEDICARE

## 2025-08-06 VITALS
TEMPERATURE: 97.5 F | HEART RATE: 85 BPM | SYSTOLIC BLOOD PRESSURE: 122 MMHG | OXYGEN SATURATION: 96 % | RESPIRATION RATE: 16 BRPM | DIASTOLIC BLOOD PRESSURE: 66 MMHG

## 2025-08-06 DIAGNOSIS — R97.20 ELEVATED PSA: ICD-10-CM

## 2025-08-06 LAB — GLUCOSE BLD MANUAL STRIP-MCNC: 150 MG/DL (ref 74–99)

## 2025-08-06 PROCEDURE — 3700000001 HC GENERAL ANESTHESIA TIME - INITIAL BASE CHARGE: Performed by: UROLOGY

## 2025-08-06 PROCEDURE — 76872 US TRANSRECTAL: CPT | Performed by: UROLOGY

## 2025-08-06 PROCEDURE — 7100000010 HC PHASE TWO TIME - EACH INCREMENTAL 1 MINUTE: Performed by: UROLOGY

## 2025-08-06 PROCEDURE — 55700 PR PROSTATE NEEDLE BIOPSY ANY APPROACH: CPT | Performed by: UROLOGY

## 2025-08-06 PROCEDURE — 2720000007 HC OR 272 NO HCPCS: Performed by: UROLOGY

## 2025-08-06 PROCEDURE — 7100000009 HC PHASE TWO TIME - INITIAL BASE CHARGE: Performed by: UROLOGY

## 2025-08-06 PROCEDURE — 2500000004 HC RX 250 GENERAL PHARMACY W/ HCPCS (ALT 636 FOR OP/ED): Performed by: UROLOGY

## 2025-08-06 PROCEDURE — 3600000009 HC OR TIME - EACH INCREMENTAL 1 MINUTE - PROCEDURE LEVEL FOUR: Performed by: UROLOGY

## 2025-08-06 PROCEDURE — 2500000004 HC RX 250 GENERAL PHARMACY W/ HCPCS (ALT 636 FOR OP/ED)

## 2025-08-06 PROCEDURE — 3700000002 HC GENERAL ANESTHESIA TIME - EACH INCREMENTAL 1 MINUTE: Performed by: UROLOGY

## 2025-08-06 PROCEDURE — 3600000004 HC OR TIME - INITIAL BASE CHARGE - PROCEDURE LEVEL FOUR: Performed by: UROLOGY

## 2025-08-06 PROCEDURE — 76942 ECHO GUIDE FOR BIOPSY: CPT | Performed by: UROLOGY

## 2025-08-06 PROCEDURE — G0416 PROSTATE BIOPSY, ANY MTHD: HCPCS | Mod: TC,SUR,BEALAB | Performed by: UROLOGY

## 2025-08-06 PROCEDURE — 7100000002 HC RECOVERY ROOM TIME - EACH INCREMENTAL 1 MINUTE: Performed by: UROLOGY

## 2025-08-06 PROCEDURE — 82947 ASSAY GLUCOSE BLOOD QUANT: CPT

## 2025-08-06 PROCEDURE — 7100000001 HC RECOVERY ROOM TIME - INITIAL BASE CHARGE: Performed by: UROLOGY

## 2025-08-06 RX ORDER — ONDANSETRON HYDROCHLORIDE 2 MG/ML
4 INJECTION, SOLUTION INTRAVENOUS ONCE AS NEEDED
Status: DISCONTINUED | OUTPATIENT
Start: 2025-08-06 | End: 2025-08-06 | Stop reason: HOSPADM

## 2025-08-06 RX ORDER — METOCLOPRAMIDE HYDROCHLORIDE 5 MG/ML
10 INJECTION INTRAMUSCULAR; INTRAVENOUS ONCE AS NEEDED
Status: DISCONTINUED | OUTPATIENT
Start: 2025-08-06 | End: 2025-08-06 | Stop reason: HOSPADM

## 2025-08-06 RX ORDER — OXYCODONE HYDROCHLORIDE 5 MG/1
5 TABLET ORAL EVERY 4 HOURS PRN
Status: DISCONTINUED | OUTPATIENT
Start: 2025-08-06 | End: 2025-08-06 | Stop reason: HOSPADM

## 2025-08-06 RX ORDER — CEFTRIAXONE 2 G/50ML
2 INJECTION, SOLUTION INTRAVENOUS ONCE
Status: COMPLETED | OUTPATIENT
Start: 2025-08-06 | End: 2025-08-06

## 2025-08-06 RX ORDER — FENTANYL CITRATE 50 UG/ML
50 INJECTION, SOLUTION INTRAMUSCULAR; INTRAVENOUS EVERY 5 MIN PRN
Status: DISCONTINUED | OUTPATIENT
Start: 2025-08-06 | End: 2025-08-06 | Stop reason: HOSPADM

## 2025-08-06 RX ORDER — FENTANYL CITRATE 50 UG/ML
INJECTION, SOLUTION INTRAMUSCULAR; INTRAVENOUS AS NEEDED
Status: DISCONTINUED | OUTPATIENT
Start: 2025-08-06 | End: 2025-08-06

## 2025-08-06 RX ORDER — PHENYLEPHRINE HCL IN 0.9% NACL 0.4MG/10ML
SYRINGE (ML) INTRAVENOUS AS NEEDED
Status: DISCONTINUED | OUTPATIENT
Start: 2025-08-06 | End: 2025-08-06

## 2025-08-06 RX ORDER — MIDAZOLAM HYDROCHLORIDE 1 MG/ML
INJECTION, SOLUTION INTRAMUSCULAR; INTRAVENOUS AS NEEDED
Status: DISCONTINUED | OUTPATIENT
Start: 2025-08-06 | End: 2025-08-06

## 2025-08-06 RX ORDER — SODIUM CHLORIDE, SODIUM LACTATE, POTASSIUM CHLORIDE, CALCIUM CHLORIDE 600; 310; 30; 20 MG/100ML; MG/100ML; MG/100ML; MG/100ML
100 INJECTION, SOLUTION INTRAVENOUS CONTINUOUS
Status: DISCONTINUED | OUTPATIENT
Start: 2025-08-06 | End: 2025-08-06 | Stop reason: HOSPADM

## 2025-08-06 RX ORDER — LIDOCAINE HYDROCHLORIDE 10 MG/ML
INJECTION, SOLUTION EPIDURAL; INFILTRATION; INTRACAUDAL; PERINEURAL AS NEEDED
Status: DISCONTINUED | OUTPATIENT
Start: 2025-08-06 | End: 2025-08-06

## 2025-08-06 RX ORDER — LIDOCAINE HYDROCHLORIDE 10 MG/ML
0.1 INJECTION, SOLUTION EPIDURAL; INFILTRATION; INTRACAUDAL; PERINEURAL ONCE
Status: DISCONTINUED | OUTPATIENT
Start: 2025-08-06 | End: 2025-08-06 | Stop reason: HOSPADM

## 2025-08-06 RX ORDER — CIPROFLOXACIN 500 MG/1
500 TABLET, FILM COATED ORAL 2 TIMES DAILY
Qty: 4 TABLET | Refills: 0 | Status: SHIPPED | OUTPATIENT
Start: 2025-08-06

## 2025-08-06 RX ORDER — PROPOFOL 10 MG/ML
INJECTION, EMULSION INTRAVENOUS CONTINUOUS PRN
Status: DISCONTINUED | OUTPATIENT
Start: 2025-08-06 | End: 2025-08-06

## 2025-08-06 RX ORDER — HYDROMORPHONE HYDROCHLORIDE 0.2 MG/ML
0.2 INJECTION INTRAMUSCULAR; INTRAVENOUS; SUBCUTANEOUS EVERY 5 MIN PRN
Status: DISCONTINUED | OUTPATIENT
Start: 2025-08-06 | End: 2025-08-06 | Stop reason: HOSPADM

## 2025-08-06 RX ADMIN — Medication 200 MCG: at 09:56

## 2025-08-06 RX ADMIN — PROPOFOL 125 MCG/KG/MIN: 10 INJECTION, EMULSION INTRAVENOUS at 09:49

## 2025-08-06 RX ADMIN — MIDAZOLAM 1 MG: 1 INJECTION INTRAMUSCULAR; INTRAVENOUS at 09:48

## 2025-08-06 RX ADMIN — FENTANYL CITRATE 50 MCG: 0.05 INJECTION, SOLUTION INTRAMUSCULAR; INTRAVENOUS at 09:48

## 2025-08-06 RX ADMIN — DEXAMETHASONE SODIUM PHOSPHATE 4 MG: 4 INJECTION INTRA-ARTICULAR; INTRALESIONAL; INTRAMUSCULAR; INTRAVENOUS; SOFT TISSUE at 09:51

## 2025-08-06 RX ADMIN — Medication 100 MCG: at 09:59

## 2025-08-06 RX ADMIN — PROPOFOL 40 MG: 10 INJECTION, EMULSION INTRAVENOUS at 09:50

## 2025-08-06 RX ADMIN — CEFTRIAXONE 2 G: 2 INJECTION, SOLUTION INTRAVENOUS at 09:48

## 2025-08-06 RX ADMIN — LIDOCAINE HYDROCHLORIDE 5 ML: 10 INJECTION, SOLUTION EPIDURAL; INFILTRATION; INTRACAUDAL; PERINEURAL at 09:49

## 2025-08-06 RX ADMIN — PROPOFOL 20 MG: 10 INJECTION, EMULSION INTRAVENOUS at 09:51

## 2025-08-06 SDOH — HEALTH STABILITY: MENTAL HEALTH: CURRENT SMOKER: 0

## 2025-08-06 ASSESSMENT — PAIN - FUNCTIONAL ASSESSMENT
PAIN_FUNCTIONAL_ASSESSMENT: 0-10

## 2025-08-06 ASSESSMENT — COLUMBIA-SUICIDE SEVERITY RATING SCALE - C-SSRS
1. IN THE PAST MONTH, HAVE YOU WISHED YOU WERE DEAD OR WISHED YOU COULD GO TO SLEEP AND NOT WAKE UP?: NO
6. HAVE YOU EVER DONE ANYTHING, STARTED TO DO ANYTHING, OR PREPARED TO DO ANYTHING TO END YOUR LIFE?: NO
2. HAVE YOU ACTUALLY HAD ANY THOUGHTS OF KILLING YOURSELF?: NO

## 2025-08-06 ASSESSMENT — PAIN SCALES - GENERAL
PAINLEVEL_OUTOF10: 0 - NO PAIN

## 2025-08-06 NOTE — OP NOTE
BIOPSY, PROSTATE, WITH IMAGING GUIDANCE (URONAV, ULTRASOUND AND TECH, MRI DONE AT [place] [facility], FLEETS [facility] AM OF SURGERY) Operative Note     Date: 2025  OR Location: [facility]    Name: Anthony Cleaning, : 1950, Age: 74 y.o., MRN: [id number], Sex: male    Diagnosis  Pre-op Diagnosis      * Elevated PSA [R97.20] Post-op Diagnosis     * Elevated PSA [R97.20]     Procedures  MRI fusion guided biopsy of the prostate    Surgeons      * Jay Walter - Primary    Resident/Fellow/Other Assistant:  Surgeons and Role:  * No surgeons found with a matching role *    Staff:   Circulator: Anthony Martins Person: Natasha  Circulator: [place]    Anesthesia Staff: Anesthesiologist: Chema Cárdenas MD  C-AA: SHAMEKA Bell    Procedure Summary  Anesthesia: Monitor Anesthesia Care  ASA: III  Estimated Blood Loss: 3mL  Intra-op Medications:   Administrations occurring from 905 to 950 on 25:   Medication Name Total Dose   cefTRIAXone (Rocephin) 2 g in dextrose (iso) IV 50 mL 2 g   fentaNYL (Sublimaze) injection 50 mcg/mL 50 mcg   lidocaine PF (Xylocaine-MPF) local injection 1 % 5 mL   midazolam (Versed) injection 1 mg/mL 1 mg   propofol (Diprivan) injection 10 mg/mL 52.85 mg              Anesthesia Record               Intraprocedure I/O Totals          Intake    cefTRIAXone (Rocephin) 2 g in dextrose (iso) IV 50 mL 50.00 mL    Total Intake 50 mL          Specimen:   ID Type Source Tests Collected by Time   1 : AREA OF INTEREST 1 - LEFT MID POSTERIOR Tissue PROSTATE BIOPSY TARGETED CAROL SURGICAL PATHOLOGY EXAM Jay Walter MD 2025   2 : RIGHT APEX Tissue PROSTATE NEEDLE BIOPSY RIGHT SURGICAL PATHOLOGY EXAM Jay Walter MD 2025   3 : RIGHT MID Tissue PROSTATE NEEDLE BIOPSY RIGHT SURGICAL PATHOLOGY EXAM Jay Walter MD 2025   4 : RIGHT BASE Tissue PROSTATE NEEDLE BIOPSY RIGHT SURGICAL PATHOLOGY EXAM Jay Walter MD 2025   5 : LEFT APEX Tissue  PROSTATE NEEDLE BIOPSY LEFT SURGICAL PATHOLOGY EXAM Jay Walter MD 8/6/2025 0937   6 : LEFT MID Tissue PROSTATE NEEDLE BIOPSY LEFT SURGICAL PATHOLOGY EXAM Jay Walter MD 8/6/2025 0937   7 : LEFT BASE Tissue PROSTATE NEEDLE BIOPSY LEFT SURGICAL PATHOLOGY EXAM Jay Walter MD 8/6/2025 0937                   Drains and/or Catheters: None    Findings: Biopsies obtained    Indications: Anthony Cleaning is an 74 y.o. male who is having surgery for Elevated PSA [R97.20].  MRI identified a PI-RADS 5 lesion in the left prostate.  He presents for MRI fusion guided biopsy.  He also had some lymphadenopathy.  He is also being evaluated by heme-onc for an elevated white blood count.  Risks including infection, bleeding were reviewed.  Written consent was obtained.    Procedure Details: He was taken to the operating room and given MAC anesthesia.  He is placed in the left lateral decubitus position.  Digital rectal exam identified a firm region on the left prostate.  Ultrasound probe was inserted.  The prostate measured 3.9 cm in height 4.9 cm in width and 7.1 cm in length.    Ultrasound was used for needle guidance.  Biopsies were obtained from the PI-RADS 5 lesion.  Biopsies were subsequent attained from the right left apex mid gland and base.  He tolerated the procedure and was transferred to the recovery room in stable condition.    Complications:  None; patient tolerated the procedure well.              Jay Walter  Phone Number: [phone]

## 2025-08-06 NOTE — DISCHARGE INSTRUCTIONS
Follow up in 3 weeks.    Call or go to emergency department with a fever.  You may eat a regular diet  You may shower.  Avoid strenuous activity for 24 hours    Contact information:  Urology main number: [phone]  Urology : [phone]  Urology nurse: [phone]  Dr. Walter [place] for emergency: [phone]

## 2025-08-06 NOTE — ANESTHESIA POSTPROCEDURE EVALUATION
Patient: Anthony Cleaning    Procedure Summary       Date: 08/06/25 Room / Location: [facility] 01 / [facility]    Anesthesia Start: 0945 Anesthesia Stop: 1010    Procedure: BIOPSY, PROSTATE, WITH IMAGING GUIDANCE (URONAV, ULTRASOUND AND TECH, MRI DONE AT [place] 6/30/25, FLEETS [facility] AM OF SURGERY) Diagnosis:       Elevated PSA      (Elevated PSA [R97.20])    Surgeons: Jay Walter MD Responsible Provider: Chema Cárdenas MD    Anesthesia Type: MAC ASA Status: 3            Anesthesia Type: MAC    Vitals Value Taken Time   BP 98/67 08/06/25 10:15   Temp 37.2 °C (99 °F) 08/06/25 10:04   Pulse 95 08/06/25 10:15   Resp 14 08/06/25 10:15   SpO2 95 % 08/06/25 10:15       Anesthesia Post Evaluation    Patient location during evaluation: bedside  Patient participation: complete - patient participated  Level of consciousness: awake and alert  Pain management: adequate  Airway patency: patent  Cardiovascular status: acceptable  Respiratory status: acceptable  Hydration status: acceptable  Postoperative Nausea and Vomiting: none        No notable events documented.

## 2025-08-06 NOTE — ANESTHESIA PREPROCEDURE EVALUATION
Patient: Anthony Cleaning    Procedure Information       Date/Time: 08/06/25 0905    Procedure: BIOPSY, PROSTATE, WITH IMAGING GUIDANCE (URONAV, ULTRASOUND AND TECH, MRI DONE AT [facility] 6/30/25, FLEETS [place] AM OF SURGERY) - Transperineal, MRI fiusion    Location: [facility] 01 / [facility]    Surgeons: Jay Walter MD            Relevant Problems   Anesthesia (within normal limits)      Cardiac   (+) Benign essential hypertension   (+) Hypertension   (+) Mixed hyperlipidemia      Pulmonary (within normal limits)      Neuro (within normal limits)   (+) Transient ischemic attack      GI (within normal limits)      /Renal   (+) Benign prostatic hyperplasia with weak urinary stream      Liver (within normal limits)      Endocrine   (+) Type 2 diabetes mellitus without complication, without long-term current use of insulin      Hematology (within normal limits)      Musculoskeletal   (+) Primary osteoarthritis, right shoulder      HEENT   (+) Acquired hearing loss      ID (within normal limits)      Skin (within normal limits)       Clinical information reviewed:   Tobacco  Allergies  Meds   Med Hx  Surg Hx   Fam Hx  Soc Hx        NPO Detail:  NPO/Void Status  Date of Last Liquid: 08/05/25  Time of Last Liquid: 2300  Date of Last Solid: 08/05/25  Time of Last Solid: 2100  Last Intake Type: Clear fluids  Time of Last Void: 0748         Physical Exam    Airway  Mallampati: II  TM distance: >3 FB  Neck ROM: full  Mouth opening: 3 or more finger widths     Cardiovascular - normal exam   Dental   Comments: Multiple Missing teeth     Pulmonary - normal exam   Abdominal - normal exam           Anesthesia Plan    History of general anesthesia?: yes  History of complications of general anesthesia?: no    ASA 3     general     The patient is not a current smoker.    intravenous induction   Anesthetic plan and risks discussed with patient.  Use of blood products discussed with patient who consented to blood products.

## 2025-08-06 NOTE — NURSING NOTE
Patient in Phase 2; dressed and up to chair with RN assist. Tolerating po fluids, no complaint of pain and no complaint of nausea.     Child at bedside; discussed discharge instructions with patient and Child. All questions at this time answered.     Discharge instructions provided using teachback method.  Patient's health-related risk factors discussed with patient.  Patient educated to look for worsening signs and symptoms and educated to seek medical attention if experiencing medical emergency.  Patient aware of needs to follow up with outpatient clinics as scheduled. Home going meds reviewed with patient.  Patient verbalized understanding of disposition and discharge instructions.  All questions answered to patient's satisfaction and within nursing scope of practice.    Patient clinically appropriate for discharge. Vitals stable/baseline.IV removed and patient transported to discharge area via wheelchair.

## 2025-08-07 ENCOUNTER — APPOINTMENT (OUTPATIENT)
Dept: HEMATOLOGY/ONCOLOGY | Facility: CLINIC | Age: 75
End: 2025-08-07
Payer: MEDICARE

## 2025-08-13 LAB
LABORATORY COMMENT REPORT: NORMAL
PATH REPORT.FINAL DX SPEC: NORMAL
PATH REPORT.GROSS SPEC: NORMAL
PATH REPORT.RELEVANT HX SPEC: NORMAL
PATH REPORT.TOTAL CANCER: NORMAL

## 2025-08-14 DIAGNOSIS — C61 PROSTATE CANCER (MULTI): Primary | ICD-10-CM

## 2025-08-18 ENCOUNTER — APPOINTMENT (OUTPATIENT)
Dept: UROLOGY | Facility: CLINIC | Age: 75
End: 2025-08-18
Payer: MEDICARE

## 2025-08-18 DIAGNOSIS — R39.12 BENIGN PROSTATIC HYPERPLASIA WITH WEAK URINARY STREAM: ICD-10-CM

## 2025-08-18 DIAGNOSIS — C61 PROSTATE CANCER (MULTI): Primary | ICD-10-CM

## 2025-08-18 DIAGNOSIS — N40.1 BENIGN PROSTATIC HYPERPLASIA WITH WEAK URINARY STREAM: ICD-10-CM

## 2025-08-18 PROCEDURE — 1160F RVW MEDS BY RX/DR IN RCRD: CPT | Performed by: UROLOGY

## 2025-08-18 PROCEDURE — G2211 COMPLEX E/M VISIT ADD ON: HCPCS | Performed by: UROLOGY

## 2025-08-18 PROCEDURE — 99215 OFFICE O/P EST HI 40 MIN: CPT | Performed by: UROLOGY

## 2025-08-18 PROCEDURE — 1126F AMNT PAIN NOTED NONE PRSNT: CPT | Performed by: UROLOGY

## 2025-08-18 PROCEDURE — 1036F TOBACCO NON-USER: CPT | Performed by: UROLOGY

## 2025-08-18 PROCEDURE — 1159F MED LIST DOCD IN RCRD: CPT | Performed by: UROLOGY

## 2025-08-18 ASSESSMENT — PAIN SCALES - GENERAL: PAINLEVEL_OUTOF10: 0-NO PAIN

## 2025-08-21 ENCOUNTER — APPOINTMENT (OUTPATIENT)
Dept: UROLOGY | Facility: CLINIC | Age: 75
End: 2025-08-21
Payer: MEDICARE

## 2025-08-28 ENCOUNTER — OFFICE VISIT (OUTPATIENT)
Dept: URGENT CARE | Age: 75
End: 2025-08-28
Payer: MEDICARE

## 2025-08-28 ENCOUNTER — HOSPITAL ENCOUNTER (OUTPATIENT)
Dept: RADIOLOGY | Facility: CLINIC | Age: 75
Discharge: HOME | End: 2025-08-28
Payer: MEDICARE

## 2025-08-28 ENCOUNTER — LAB (OUTPATIENT)
Dept: LAB | Facility: HOSPITAL | Age: 75
End: 2025-08-28
Payer: MEDICARE

## 2025-08-28 VITALS
HEART RATE: 78 BPM | DIASTOLIC BLOOD PRESSURE: 72 MMHG | WEIGHT: 229.8 LBS | SYSTOLIC BLOOD PRESSURE: 126 MMHG | RESPIRATION RATE: 18 BRPM | BODY MASS INDEX: 36.93 KG/M2 | TEMPERATURE: 97.9 F | HEIGHT: 66 IN | OXYGEN SATURATION: 98 %

## 2025-08-28 DIAGNOSIS — R06.2 WHEEZING: Primary | ICD-10-CM

## 2025-08-28 DIAGNOSIS — C91.10 CLL (CHRONIC LYMPHOCYTIC LEUKEMIA) (MULTI): ICD-10-CM

## 2025-08-28 DIAGNOSIS — C61 PROSTATE CANCER (MULTI): ICD-10-CM

## 2025-08-28 DIAGNOSIS — R05.1 ACUTE COUGH: ICD-10-CM

## 2025-08-28 LAB
ALBUMIN SERPL BCP-MCNC: 4.7 G/DL (ref 3.4–5)
ALP SERPL-CCNC: 57 U/L (ref 33–136)
ALT SERPL W P-5'-P-CCNC: 19 U/L (ref 10–52)
ANION GAP SERPL CALCULATED.3IONS-SCNC: 11 MMOL/L (ref 10–20)
AST SERPL W P-5'-P-CCNC: 23 U/L (ref 9–39)
BASOPHILS # BLD AUTO: 0.04 X10*3/UL (ref 0–0.1)
BASOPHILS NFR BLD AUTO: 0.3 %
BILIRUB SERPL-MCNC: 0.6 MG/DL (ref 0–1.2)
BUN SERPL-MCNC: 23 MG/DL (ref 6–23)
CALCIUM SERPL-MCNC: 10.1 MG/DL (ref 8.6–10.3)
CHLORIDE SERPL-SCNC: 98 MMOL/L (ref 98–107)
CO2 SERPL-SCNC: 32 MMOL/L (ref 21–32)
CREAT SERPL-MCNC: 1.22 MG/DL (ref 0.5–1.3)
EGFRCR SERPLBLD CKD-EPI 2021: 62 ML/MIN/1.73M*2
EOSINOPHIL # BLD AUTO: 0.24 X10*3/UL (ref 0–0.4)
EOSINOPHIL NFR BLD AUTO: 2 %
ERYTHROCYTE [DISTWIDTH] IN BLOOD BY AUTOMATED COUNT: 14.6 % (ref 11.5–14.5)
GLUCOSE SERPL-MCNC: 163 MG/DL (ref 74–99)
HCT VFR BLD AUTO: 45 % (ref 41–52)
HGB BLD-MCNC: 14.6 G/DL (ref 13.5–17.5)
IMM GRANULOCYTES # BLD AUTO: 0.04 X10*3/UL (ref 0–0.5)
IMM GRANULOCYTES NFR BLD AUTO: 0.3 % (ref 0–0.9)
LDH SERPL L TO P-CCNC: 132 U/L (ref 84–246)
LYMPHOCYTES # BLD AUTO: 6.55 X10*3/UL (ref 0.8–3)
LYMPHOCYTES NFR BLD AUTO: 55.2 %
MCH RBC QN AUTO: 31.4 PG (ref 26–34)
MCHC RBC AUTO-ENTMCNC: 32.4 G/DL (ref 32–36)
MCV RBC AUTO: 97 FL (ref 80–100)
MONOCYTES # BLD AUTO: 0.74 X10*3/UL (ref 0.05–0.8)
MONOCYTES NFR BLD AUTO: 6.2 %
NEUTROPHILS # BLD AUTO: 4.26 X10*3/UL (ref 1.6–5.5)
NEUTROPHILS NFR BLD AUTO: 36 %
NRBC BLD-RTO: 0 /100 WBCS (ref 0–0)
PLATELET # BLD AUTO: 221 X10*3/UL (ref 150–450)
POC CORONAVIRUS SARS-COV-2 PCR: NEGATIVE
POC HUMAN RHINOVIRUS PCR: NEGATIVE
POC INFLUENZA A VIRUS PCR: NEGATIVE
POC INFLUENZA B VIRUS PCR: NEGATIVE
POC RESPIRATORY SYNCYTIAL VIRUS PCR: NEGATIVE
POTASSIUM SERPL-SCNC: 4.8 MMOL/L (ref 3.5–5.3)
PROT SERPL-MCNC: 7.1 G/DL (ref 6.4–8.2)
RBC # BLD AUTO: 4.65 X10*6/UL (ref 4.5–5.9)
SODIUM SERPL-SCNC: 136 MMOL/L (ref 136–145)
WBC # BLD AUTO: 11.9 X10*3/UL (ref 4.4–11.3)

## 2025-08-28 PROCEDURE — A9800 HC RX 343 DIAGNOSTIC RADIOPHARMACEUTICALS: HCPCS | Performed by: UROLOGY

## 2025-08-28 PROCEDURE — 3430000001 HC RX 343 DIAGNOSTIC RADIOPHARMACEUTICALS: Performed by: UROLOGY

## 2025-08-28 PROCEDURE — 78815 PET IMAGE W/CT SKULL-THIGH: CPT | Mod: PS

## 2025-08-28 RX ORDER — DOXYCYCLINE 100 MG/1
100 CAPSULE ORAL DAILY
Qty: 5 CAPSULE | Refills: 0 | Status: SHIPPED | OUTPATIENT
Start: 2025-08-28 | End: 2025-09-02

## 2025-08-28 RX ORDER — ALBUTEROL SULFATE 90 UG/1
2 INHALANT RESPIRATORY (INHALATION) EVERY 6 HOURS PRN
Qty: 18 G | Refills: 0 | Status: SHIPPED | OUTPATIENT
Start: 2025-08-28 | End: 2026-08-28

## 2025-08-28 RX ORDER — FLUDEOXYGLUCOSE F 18 200 MCI/ML
12 INJECTION, SOLUTION INTRAVENOUS
Status: DISCONTINUED | OUTPATIENT
Start: 2025-08-28 | End: 2025-08-28

## 2025-08-28 RX ORDER — PREDNISONE 50 MG/1
50 TABLET ORAL DAILY
Qty: 5 TABLET | Refills: 0 | Status: SHIPPED | OUTPATIENT
Start: 2025-08-28 | End: 2025-09-02

## 2025-08-28 RX ADMIN — KIT FOR THE PREPARATION OF GALLIUM GA 68 GOZETOTIDE 5.15 MILLICURIE: 25 INJECTION, POWDER, LYOPHILIZED, FOR SOLUTION INTRAVENOUS at 08:05

## 2025-09-02 ENCOUNTER — OFFICE VISIT (OUTPATIENT)
Dept: HEMATOLOGY/ONCOLOGY | Facility: CLINIC | Age: 75
End: 2025-09-02
Payer: MEDICARE

## 2025-09-02 VITALS
DIASTOLIC BLOOD PRESSURE: 65 MMHG | RESPIRATION RATE: 18 BRPM | BODY MASS INDEX: 37.13 KG/M2 | TEMPERATURE: 97 F | HEART RATE: 85 BPM | WEIGHT: 230.05 LBS | SYSTOLIC BLOOD PRESSURE: 125 MMHG | OXYGEN SATURATION: 98 %

## 2025-09-02 DIAGNOSIS — C91.10 CLL (CHRONIC LYMPHOCYTIC LEUKEMIA) (MULTI): Primary | ICD-10-CM

## 2025-09-02 DIAGNOSIS — D72.821 MONOCYTOSIS: ICD-10-CM

## 2025-09-02 DIAGNOSIS — D72.829 LEUKOCYTOSIS, UNSPECIFIED TYPE: ICD-10-CM

## 2025-09-02 PROCEDURE — 3074F SYST BP LT 130 MM HG: CPT | Performed by: STUDENT IN AN ORGANIZED HEALTH CARE EDUCATION/TRAINING PROGRAM

## 2025-09-02 PROCEDURE — 1159F MED LIST DOCD IN RCRD: CPT | Performed by: STUDENT IN AN ORGANIZED HEALTH CARE EDUCATION/TRAINING PROGRAM

## 2025-09-02 PROCEDURE — 3051F HG A1C>EQUAL 7.0%<8.0%: CPT | Performed by: STUDENT IN AN ORGANIZED HEALTH CARE EDUCATION/TRAINING PROGRAM

## 2025-09-02 PROCEDURE — 99214 OFFICE O/P EST MOD 30 MIN: CPT | Performed by: STUDENT IN AN ORGANIZED HEALTH CARE EDUCATION/TRAINING PROGRAM

## 2025-09-02 PROCEDURE — 1126F AMNT PAIN NOTED NONE PRSNT: CPT | Performed by: STUDENT IN AN ORGANIZED HEALTH CARE EDUCATION/TRAINING PROGRAM

## 2025-09-02 PROCEDURE — 3078F DIAST BP <80 MM HG: CPT | Performed by: STUDENT IN AN ORGANIZED HEALTH CARE EDUCATION/TRAINING PROGRAM

## 2025-09-02 ASSESSMENT — PAIN SCALES - GENERAL: PAINLEVEL_OUTOF10: 0-NO PAIN

## 2025-09-04 ENCOUNTER — TELEMEDICINE (OUTPATIENT)
Dept: UROLOGY | Facility: CLINIC | Age: 75
End: 2025-09-04
Payer: MEDICARE

## 2025-09-04 DIAGNOSIS — C61 PROSTATE CANCER (MULTI): Primary | ICD-10-CM

## 2025-09-04 PROCEDURE — 1160F RVW MEDS BY RX/DR IN RCRD: CPT | Performed by: UROLOGY

## 2025-09-04 PROCEDURE — 1159F MED LIST DOCD IN RCRD: CPT | Performed by: UROLOGY

## 2025-09-04 PROCEDURE — G2211 COMPLEX E/M VISIT ADD ON: HCPCS | Performed by: UROLOGY

## 2025-09-04 PROCEDURE — 3051F HG A1C>EQUAL 7.0%<8.0%: CPT | Performed by: UROLOGY

## 2025-09-04 PROCEDURE — 1036F TOBACCO NON-USER: CPT | Performed by: UROLOGY

## 2025-09-04 PROCEDURE — 99213 OFFICE O/P EST LOW 20 MIN: CPT | Performed by: UROLOGY

## 2025-09-05 LAB
ELECTRONICALLY SIGNED BY: NORMAL
ELECTRONICALLY SIGNED BY: NORMAL
IGHV RESULTS: NORMAL
LYMPHOID NGS RESULTS: NORMAL

## 2025-09-09 ENCOUNTER — APPOINTMENT (OUTPATIENT)
Dept: CARDIOLOGY | Facility: CLINIC | Age: 75
End: 2025-09-09
Payer: MEDICARE

## 2025-10-21 ENCOUNTER — APPOINTMENT (OUTPATIENT)
Dept: CARDIOLOGY | Facility: CLINIC | Age: 75
End: 2025-10-21
Payer: MEDICARE

## (undated) DEVICE — DRESSING, TELFA, 3X4

## (undated) DEVICE — Device

## (undated) DEVICE — PROBE COVER, ULTRASOUND 8818

## (undated) DEVICE — GOWN, SURGICAL, SIRUS, NON REINFORCED, LARGE

## (undated) DEVICE — GLOVE, PROTEXIS PI CLASSIC, SZ-7.5, PF, LF

## (undated) DEVICE — SOLUTION, IRRIGATION, X RX SODIUM CHL 0.9%, 1000ML BTL